# Patient Record
Sex: MALE | Race: OTHER | NOT HISPANIC OR LATINO | URBAN - METROPOLITAN AREA
[De-identification: names, ages, dates, MRNs, and addresses within clinical notes are randomized per-mention and may not be internally consistent; named-entity substitution may affect disease eponyms.]

---

## 2021-11-12 ENCOUNTER — INPATIENT (INPATIENT)
Facility: HOSPITAL | Age: 21
LOS: 5 days | Discharge: HOME | DRG: 178 | End: 2021-11-18
Payer: SELF-PAY

## 2021-11-12 VITALS
DIASTOLIC BLOOD PRESSURE: 66 MMHG | OXYGEN SATURATION: 98 % | SYSTOLIC BLOOD PRESSURE: 100 MMHG | HEART RATE: 118 BPM | RESPIRATION RATE: 18 BRPM | HEIGHT: 69 IN | WEIGHT: 107.14 LBS | TEMPERATURE: 97 F

## 2021-11-12 DIAGNOSIS — D50.9 IRON DEFICIENCY ANEMIA, UNSPECIFIED: ICD-10-CM

## 2021-11-12 DIAGNOSIS — R63.8 OTHER SYMPTOMS AND SIGNS CONCERNING FOOD AND FLUID INTAKE: ICD-10-CM

## 2021-11-12 DIAGNOSIS — R05.9 COUGH, UNSPECIFIED: ICD-10-CM

## 2021-11-12 DIAGNOSIS — J90 PLEURAL EFFUSION, NOT ELSEWHERE CLASSIFIED: ICD-10-CM

## 2021-11-12 DIAGNOSIS — D64.9 ANEMIA, UNSPECIFIED: ICD-10-CM

## 2021-11-12 LAB
ALBUMIN SERPL ELPH-MCNC: 3.4 G/DL — SIGNIFICANT CHANGE UP (ref 3.3–5)
ALP SERPL-CCNC: 94 U/L — SIGNIFICANT CHANGE UP (ref 40–120)
ALT FLD-CCNC: 8 U/L — LOW (ref 10–45)
ANION GAP SERPL CALC-SCNC: 9 MMOL/L — SIGNIFICANT CHANGE UP (ref 5–17)
APTT BLD: 33.9 SEC — SIGNIFICANT CHANGE UP (ref 27.5–35.5)
AST SERPL-CCNC: 36 U/L — SIGNIFICANT CHANGE UP (ref 10–40)
BASOPHILS # BLD AUTO: 0.03 K/UL — SIGNIFICANT CHANGE UP (ref 0–0.2)
BASOPHILS NFR BLD AUTO: 0.6 % — SIGNIFICANT CHANGE UP (ref 0–2)
BILIRUB SERPL-MCNC: <0.2 MG/DL — SIGNIFICANT CHANGE UP (ref 0.2–1.2)
BUN SERPL-MCNC: 10 MG/DL — SIGNIFICANT CHANGE UP (ref 7–23)
CALCIUM SERPL-MCNC: 8.8 MG/DL — SIGNIFICANT CHANGE UP (ref 8.4–10.5)
CHLORIDE SERPL-SCNC: 106 MMOL/L — SIGNIFICANT CHANGE UP (ref 96–108)
CO2 SERPL-SCNC: 25 MMOL/L — SIGNIFICANT CHANGE UP (ref 22–31)
CREAT SERPL-MCNC: 0.8 MG/DL — SIGNIFICANT CHANGE UP (ref 0.5–1.3)
EOSINOPHIL # BLD AUTO: 0.16 K/UL — SIGNIFICANT CHANGE UP (ref 0–0.5)
EOSINOPHIL NFR BLD AUTO: 2.9 % — SIGNIFICANT CHANGE UP (ref 0–6)
GLUCOSE SERPL-MCNC: 98 MG/DL — SIGNIFICANT CHANGE UP (ref 70–99)
HCT VFR BLD CALC: 38.6 % — LOW (ref 39–50)
HGB BLD-MCNC: 11.8 G/DL — LOW (ref 13–17)
IMM GRANULOCYTES NFR BLD AUTO: 0.2 % — SIGNIFICANT CHANGE UP (ref 0–1.5)
INR BLD: 1.33 — HIGH (ref 0.88–1.16)
LYMPHOCYTES # BLD AUTO: 1.32 K/UL — SIGNIFICANT CHANGE UP (ref 1–3.3)
LYMPHOCYTES # BLD AUTO: 24.3 % — SIGNIFICANT CHANGE UP (ref 13–44)
MCHC RBC-ENTMCNC: 23.2 PG — LOW (ref 27–34)
MCHC RBC-ENTMCNC: 30.6 GM/DL — LOW (ref 32–36)
MCV RBC AUTO: 75.8 FL — LOW (ref 80–100)
MONOCYTES # BLD AUTO: 0.6 K/UL — SIGNIFICANT CHANGE UP (ref 0–0.9)
MONOCYTES NFR BLD AUTO: 11 % — SIGNIFICANT CHANGE UP (ref 2–14)
NEUTROPHILS # BLD AUTO: 3.31 K/UL — SIGNIFICANT CHANGE UP (ref 1.8–7.4)
NEUTROPHILS NFR BLD AUTO: 61 % — SIGNIFICANT CHANGE UP (ref 43–77)
NRBC # BLD: 0 /100 WBCS — SIGNIFICANT CHANGE UP (ref 0–0)
PLATELET # BLD AUTO: 453 K/UL — HIGH (ref 150–400)
POTASSIUM SERPL-MCNC: 4.4 MMOL/L — SIGNIFICANT CHANGE UP (ref 3.5–5.3)
POTASSIUM SERPL-SCNC: 4.4 MMOL/L — SIGNIFICANT CHANGE UP (ref 3.5–5.3)
PROCALCITONIN SERPL-MCNC: 0.18 NG/ML — HIGH (ref 0.02–0.1)
PROT SERPL-MCNC: 7.5 G/DL — SIGNIFICANT CHANGE UP (ref 6–8.3)
PROTHROM AB SERPL-ACNC: 15.7 SEC — HIGH (ref 10.6–13.6)
RAPID RVP RESULT: SIGNIFICANT CHANGE UP
RBC # BLD: 5.09 M/UL — SIGNIFICANT CHANGE UP (ref 4.2–5.8)
RBC # FLD: 16.1 % — HIGH (ref 10.3–14.5)
SARS-COV-2 RNA SPEC QL NAA+PROBE: SIGNIFICANT CHANGE UP
SODIUM SERPL-SCNC: 140 MMOL/L — SIGNIFICANT CHANGE UP (ref 135–145)
WBC # BLD: 5.43 K/UL — SIGNIFICANT CHANGE UP (ref 3.8–10.5)
WBC # FLD AUTO: 5.43 K/UL — SIGNIFICANT CHANGE UP (ref 3.8–10.5)

## 2021-11-12 PROCEDURE — 93010 ELECTROCARDIOGRAM REPORT: CPT

## 2021-11-12 PROCEDURE — 99222 1ST HOSP IP/OBS MODERATE 55: CPT | Mod: GC

## 2021-11-12 PROCEDURE — 99285 EMERGENCY DEPT VISIT HI MDM: CPT | Mod: 25

## 2021-11-12 PROCEDURE — 71250 CT THORAX DX C-: CPT | Mod: 26,MA

## 2021-11-12 RX ORDER — SODIUM CHLORIDE 9 MG/ML
5 INJECTION INTRAMUSCULAR; INTRAVENOUS; SUBCUTANEOUS EVERY 8 HOURS
Refills: 0 | Status: COMPLETED | OUTPATIENT
Start: 2021-11-12 | End: 2021-11-13

## 2021-11-12 RX ORDER — ACETAMINOPHEN 500 MG
650 TABLET ORAL EVERY 6 HOURS
Refills: 0 | Status: DISCONTINUED | OUTPATIENT
Start: 2021-11-12 | End: 2021-11-18

## 2021-11-12 RX ORDER — SODIUM CHLORIDE 9 MG/ML
1500 INJECTION INTRAMUSCULAR; INTRAVENOUS; SUBCUTANEOUS ONCE
Refills: 0 | Status: COMPLETED | OUTPATIENT
Start: 2021-11-12 | End: 2021-11-12

## 2021-11-12 RX ORDER — ACETAMINOPHEN 500 MG
650 TABLET ORAL ONCE
Refills: 0 | Status: COMPLETED | OUTPATIENT
Start: 2021-11-12 | End: 2021-11-12

## 2021-11-12 RX ORDER — INFLUENZA VIRUS VACCINE 15; 15; 15; 15 UG/.5ML; UG/.5ML; UG/.5ML; UG/.5ML
0.5 SUSPENSION INTRAMUSCULAR ONCE
Refills: 0 | Status: DISCONTINUED | OUTPATIENT
Start: 2021-11-12 | End: 2021-11-18

## 2021-11-12 RX ADMIN — SODIUM CHLORIDE 1500 MILLILITER(S): 9 INJECTION INTRAMUSCULAR; INTRAVENOUS; SUBCUTANEOUS at 18:10

## 2021-11-12 RX ADMIN — Medication 650 MILLIGRAM(S): at 18:10

## 2021-11-12 NOTE — ED PROVIDER NOTE - CLINICAL SUMMARY MEDICAL DECISION MAKING FREE TEXT BOX
Patient in ED w concern for cough, fever over the past several days.  Patient well appearing, lungs clear with decreased breath sounds to RLL.  Plueral effusion noted on outpatient CXR - sent to ED for further eval.  CT chest, labs ordered on arrival. Patient in ED w concern for cough, fever over the past several days.  Patient well appearing, lungs clear with decreased breath sounds to RLL.  Plueral effusion noted on outpatient CXR - sent to ED for further eval.  CT chest, labs ordered on arrival.  Following shift completion, patient's care is handed over to oncoming physician, Dr. Weiss, for final disposition following review of CT, re eval of patient, etc.  Patient and family aware of plan and patient is stable at time of transfer of care.

## 2021-11-12 NOTE — H&P ADULT - HISTORY OF PRESENT ILLNESS
Pt is a 20 y/o M w/ no pmhx presenting with complaints of cough x5 days.    In ED, vitals T 97.4, , /76, RR 18, O2 98% on RA  Labs significant for Hb 11.8, MCV 75.8, unknown baseline  EKG: sinus tachycardia, no acute ischemic changes  Imaging: CT chest no contrast: Impression:  1. Moderate-sized right pleural effusion which is mildly loculated anteriorly, superiorly and medially encasing the right upper lobe.  2. Linear parenchymal bands of scarring and/or atelectasis within the right upper and right middle lobes with adjacent traction bronchiectasis as well as scattered tree-in-bud micronodules. Abbreviated differential includes chronic infectious and/or inflammatory etiologies such as RALPH and in the proper clinical setting primary TB cannot be excluded.     Pt is a 20 y/o M w/ no pmhx presenting with complaints of dry cough x5 days.    In ED, vitals T 97.4, , /76, RR 18, O2 98% on RA  Labs significant for Hb 11.8, MCV 75.8, unknown baseline  EKG: sinus tachycardia, no acute ischemic changes  Imaging: CT chest no contrast: Impression:  1. Moderate-sized right pleural effusion which is mildly loculated anteriorly, superiorly and medially encasing the right upper lobe.  2. Linear parenchymal bands of scarring and/or atelectasis within the right upper and right middle lobes with adjacent traction bronchiectasis as well as scattered tree-in-bud micronodules. Abbreviated differential includes chronic infectious and/or inflammatory etiologies such as RALPH and in the proper clinical setting primary TB cannot be excluded.     Pt is a 22 y/o M w/ no pmhx presenting with complaints of cough x5 days. Reports some clear/yellow sputum, denies any blood, and reports associated chest pain with cough. He also complains of GARCIA. Pt also reports fevers only at night time for the past 2 weeks with associated night sweats. He states fever were as high as 102. Denies any weight loss, recent travel. He states he was born in [place] and moved to the [place] 3 years ago. Denies any hx of TB exposures or sick contacts. No abd pain, n/v/d, blood in stool, black stools, headache, or dizziness.     In ED, vitals T 97.4, , /76, RR 18, O2 98% on RA  Labs significant for Hb 11.8, MCV 75.8, unknown baseline  EKG: sinus tachycardia, no acute ischemic changes  Imaging: CT chest no contrast: Impression:  1. Moderate-sized right pleural effusion which is mildly loculated anteriorly, superiorly and medially encasing the right upper lobe.  2. Linear parenchymal bands of scarring and/or atelectasis within the right upper and right middle lobes with adjacent traction bronchiectasis as well as scattered tree-in-bud micronodules. Abbreviated differential includes chronic infectious and/or inflammatory etiologies such as RALPH and in the proper clinical setting primary TB cannot be excluded.     Pt is a 22 y/o M w/ no pmhx presenting with complaints of cough x5 days. Reports some clear/yellow sputum, denies any blood, and reports associated chest pain with cough. He also complains of GARCIA. Pt also reports fevers only at night time for the past 2 weeks with associated night sweats. He states fever were as high as 102. Denies any weight loss, recent travel. He states he was born in [place] and moved to the [place] years ago. Denies any hx of TB exposures or sick contacts. No abd pain, n/v/d, blood in stool, black stools, headache, or dizziness.     In ED, vitals T 97.4, , /76, RR 18, O2 98% on RA  Labs significant for Hb 11.8, MCV 75.8, unknown baseline  EKG: sinus tachycardia, no acute ischemic changes  Imaging: CT chest no contrast: Impression:  1. Moderate-sized right pleural effusion which is mildly loculated anteriorly, superiorly and medially encasing the right upper lobe.  2. Linear parenchymal bands of scarring and/or atelectasis within the right upper and right middle lobes with adjacent traction bronchiectasis as well as scattered tree-in-bud micronodules. Abbreviated differential includes chronic infectious and/or inflammatory etiologies such as RALPH and in the proper clinical setting primary TB cannot be excluded.  ED management: s/p 1.5 L NS, tylenol, pulm consulted.   Pt is a 22 y/o M w/ no pmhx presenting with complaints of cough x5 days. Denies any blood in sputum. Reports associated chest pain only when he coughs. He states he also has some SOB on exertion, feels comfortable at rest. Pt also reports fevers only at night time for the past 2 weeks with associated night sweats. He states fever were as high as 102. Denies any weight loss, recent travel. He states he was born in [place] and moved [place] the [place] years ago. Denies any hx of TB/exposures. No abd pain, n/v/d, blood in stool, black stools, headache, or dizziness.     In ED, vitals T 97.4, , /76, RR 18, O2 98% on RA  Labs significant for Hb 11.8, MCV 75.8, unknown baseline  EKG: sinus tachycardia, no ischemic changes  Imaging: CT chest no contrast: Impression:  1. Moderate-sized right pleural effusion which is mildly loculated anteriorly, superiorly and medially encasing the right upper lobe.  2. Linear parenchymal bands of scarring and/or atelectasis within the right upper and right middle lobes with adjacent traction bronchiectasis as well as scattered tree-in-bud micronodules. Abbreviated differential includes chronic infectious and/or inflammatory etiologies such as RALPH and in the proper clinical setting primary TB cannot be excluded.  ED management: s/p 1.5 L NS, tylenol, pulm consulted.

## 2021-11-12 NOTE — H&P ADULT - ATTENDING COMMENTS
21 year old male patient [place] presented with worsening productive cough for 5 days duration associated with shortness of breath on exertion. patient is observed to be tachycardic in the ED to 118 and his labs are significant for anemia of Hgb 11.8, MCV 75.8. And his CT chest shows moderate-sized right pleural effusion which is mildly loculated anteriorly, superiorly and medially encasing the right upper lobe and linear parenchymal bands of scarring and/or atelectasis within the right upper and right middle lobes with adjacent traction bronchiectasis as well as scattered tree-in-bud micronodules. Patient is admitted for workup of cough and abnormal lung ct findings and concern of active tb.    1. Dyspnea on exertion  likely secondary to an infectious etiology  procalcitonin for eval of typical bacterial infection  RVP is negative for viral infection  fungitell for eval of fungal and PCP infections    2. Pleural Effusion  pulmonary consult     3.Abnormal Chest CT  airborne precautions for r/o tb  r/o tb with sputum collection AFB    4.Anemia  iron studies  reticulocyte count  b12 and folate

## 2021-11-12 NOTE — ED PROVIDER NOTE - OBJECTIVE STATEMENT
21 year old male with no past medical history presents to ED with concern for cough, fever and night sweats x 5 days.  Patient was seen by his PCP and diagnosed with right sided pleural effusion and recommendation was made that he come to ED for further evaluation.  Patient is non toxic in appearance on arrival to ED, notes he is eating and drinking and only notices temperature elevation in the evening.  He denies recent travel, history of TB, noted to be fully vaccinated per parents at bedside.  Patient denies associated neck pain/stiffness, headache, visual changes, chest pain, shortness of breath, abdominal pain, nausea, emesis, changes to bowel movements, peripheral edema, calf pain/tenderness, recent travel, known sick contacts or any additional acute complaints or concerns at this time.

## 2021-11-12 NOTE — ED PROVIDER NOTE - NS ED ROS FT
Constitutional: + Fever.  Eyes: No pain, blurry vision, or discharge.  ENMT: No hearing changes, pain, discharge or infections. No neck pain or stiffness.  Cardiac: No chest pain, SOB or edema. No chest pain with exertion.  Respiratory: + cough, no respiratory distress. No hemoptysis. No history of asthma or RAD.  GI: No nausea, vomiting, diarrhea or abdominal pain.  : No dysuria, frequency or burning.  MS: No myalgia, muscle weakness, joint pain or back pain.  Neuro: No headache or weakness. No LOC.  Skin: No skin rash.   Endocrine: No history of thyroid disease or diabetes.  Except as documented in the HPI, all other systems are negative.

## 2021-11-12 NOTE — H&P ADULT - ASSESSMENT
Pt is a 22 y/o M w/ no pmhx presenting with complaints of cough x5 days, found to have right sided pleural effusion. Pt admitted for further eval/management of pleural effusion and r/o TB.

## 2021-11-12 NOTE — H&P ADULT - NSHPPHYSICALEXAM_GEN_ALL_CORE
VITAL SIGNS:  T(C): 36.3 (11-12-21 @ 15:04), Max: 36.3 (11-12-21 @ 15:04)  T(F): 97.4 (11-12-21 @ 15:04), Max: 97.4 (11-12-21 @ 15:04)  HR: 118 (11-12-21 @ 15:04) (118 - 118)  BP: 100/66 (11-12-21 @ 15:04) (100/66 - 100/66)  BP(mean): --  RR: 18 (11-12-21 @ 15:04) (18 - 18)  SpO2: 98% (11-12-21 @ 15:04) (98% - 98%)  Wt(kg): --    PHYSICAL EXAM:    Constitutional: WDWN resting comfortably in bed; NAD  Head: NC/AT  Eyes: PERRL, EOMI, anicteric sclera  ENT: no nasal discharge; uvula midline, no oropharyngeal erythema or exudates; MMM  Neck: supple; no JVD or thyromegaly  Respiratory: CTA B/L; no W/R/R, no retractions  Cardiac: +S1/S2; RRR; no M/R/G  Gastrointestinal: abdomen soft, NT/ND; no rebound or guarding; +BSx4  Back: spine midline, no bony tenderness or step-offs  Extremities: WWP, no clubbing or cyanosis; no peripheral edema  Musculoskeletal: NROM x4; no joint swelling, tenderness or erythema  Vascular: 2+ radial, DP/PT pulses B/L  Dermatologic: skin warm, dry and intact; no rashes, wounds, or scars  Lymphatic: no submandibular or cervical LAD  Neurologic: AAOx3; CNII-XII grossly intact; no focal deficits  Psychiatric: affect and characteristics of appearance, verbalizations, behaviors are appropriate VITAL SIGNS:  T(C): 36.3 (11-12-21 @ 15:04), Max: 36.3 (11-12-21 @ 15:04)  T(F): 97.4 (11-12-21 @ 15:04), Max: 97.4 (11-12-21 @ 15:04)  HR: 118 (11-12-21 @ 15:04) (118 - 118)  BP: 100/66 (11-12-21 @ 15:04) (100/66 - 100/66)  BP(mean): --  RR: 18 (11-12-21 @ 15:04) (18 - 18)  SpO2: 98% (11-12-21 @ 15:04) (98% - 98%)  Wt(kg): --    PHYSICAL EXAM:    Constitutional: thin male, resting comfortably in bed; NAD  Head: NC/AT  Eyes: PERRL, EOMI, anicteric sclera  ENT: no nasal discharge; uvula midline, no oropharyngeal erythema or exudates; MMM  Neck: supple; no JVD or thyromegaly  Respiratory: decreased breath sounds on the right; no W/R/R, no retractions  Cardiac: +S1/S2; RRR; no M/R/G  Gastrointestinal: abdomen soft, NT/ND; no rebound or guarding; +BSx4  Back: spine midline, no bony tenderness or step-offs  Extremities: WWP, no clubbing or cyanosis; no peripheral edema  Musculoskeletal: NROM x4; no joint swelling, tenderness or erythema  Vascular: 2+ radial, DP/PT pulses B/L  Dermatologic: skin warm, dry and intact; no rashes, wounds, or scars  Lymphatic: no submandibular or cervical LAD  Neurologic: AAOx3; CNII-XII grossly intact; no focal deficits  Psychiatric: affect and characteristics of appearance, verbalizations, behaviors are appropriate VITAL SIGNS:  T(C): 36.3 (11-12-21 @ 15:04), Max: 36.3 (11-12-21 @ 15:04)  T(F): 97.4 (11-12-21 @ 15:04), Max: 97.4 (11-12-21 @ 15:04)  HR: 118 (11-12-21 @ 15:04) (118 - 118)  BP: 100/66 (11-12-21 @ 15:04) (100/66 - 100/66)  BP(mean): --  RR: 18 (11-12-21 @ 15:04) (18 - 18)  SpO2: 98% (11-12-21 @ 15:04) (98% - 98%)  Wt(kg): --    PHYSICAL EXAM:    Constitutional: thin male, resting comfortably in bed; NAD  Head: NC/AT  Eyes: PERRL, EOMI, anicteric sclera  ENT: no nasal discharge; uvula midline, no oropharyngeal erythema or exudates; MMM  Neck: supple; no JVD or thyromegaly  Respiratory: decreased breath sounds on the right; no W/R/R, no retractions. speaking in full sentences, no respiratory distress.  Cardiac: +S1/S2; RRR; no M/R/G  Gastrointestinal: abdomen soft, NT/ND; no rebound or guarding; +BSx4  Back: spine midline, no bony tenderness or step-offs  Extremities: WWP, no clubbing or cyanosis; no peripheral edema  Musculoskeletal: NROM x4; no joint swelling, tenderness or erythema  Vascular: 2+ radial, DP/PT pulses B/L  Dermatologic: skin warm, dry and intact; no rashes, wounds, or scars  Lymphatic: no submandibular or cervical LAD  Neurologic: AAOx3; CNII-XII grossly intact; no focal deficits  Psychiatric: affect and characteristics of appearance, verbalizations, behaviors are appropriate

## 2021-11-12 NOTE — ED PROVIDER NOTE - PHYSICAL EXAMINATION
VITAL SIGNS: I have reviewed nursing notes and confirm.  CONSTITUTIONAL: Non toxic; in no acute distress.   SKIN:  Warm and dry, no acute rash.   HEAD:  Normocephalic, atraumatic.  EYES: PERRL.  EOM intact; conjunctiva and sclera clear.  ENT: No nasal discharge; airway clear.   NECK: Supple; non tender.  CARD: S1, S2 normal; no murmurs, gallops, or rubs. Regular rate and rhythm.   RESP:  + Decreased breath sounds to RLL.  Clear to auscultation b/l, no wheezes, rales or rhonchi.  No increased work of breathing, speaking comfortably in full sentences.    ABD: Normal bowel sounds; soft; non-distended; non-tender; no guarding/rebound.  EXT: Normal ROM. No clubbing, cyanosis or edema. 2+ pulses to b/l ue/le.  NEURO: Alert, oriented, grossly unremarkable.  5/5 strength x 4 extremities against gravity and external force.  No drift x 4 extremities.  Sensation intact and symmetric x 4 extremities.  No facial asymmetry.    PSYCH: Cooperative, mood and affect appropriate.

## 2021-11-12 NOTE — ED PROVIDER NOTE - CARE PLAN
Principal Discharge DX:	Cough  Secondary Diagnosis:	Fever   1 Principal Discharge DX:	Cough  Secondary Diagnosis:	Fever  Secondary Diagnosis:	Pleural effusion

## 2021-11-12 NOTE — H&P ADULT - PROBLEM SELECTOR PLAN 2
- moderate sized right pleural effusion  - ddx included likely infectious - fungal/edgardo vs bacterial vs tb, less likely malignancy or autoimmune  - f/u pulm recs for possible thoracentesis - moderate sized right pleural effusion  - ddx included likely infectious - bacterial vs tb/edgardo vs fungal, less likely malignancy or autoimmune  - f/u pulm recs for possible thoracentesis

## 2021-11-12 NOTE — ED ADULT NURSE NOTE - OBJECTIVE STATEMENT
20 y/o M Presents to the ED from PCP to r/o TB. Pt was recently diagnosed with plural effusion with worsening symptoms of SOB, night sweats, chills, fever. DENIES weight loss, n/v, chest pain, hemoptysis.

## 2021-11-12 NOTE — H&P ADULT - PROBLEM SELECTOR PLAN 1
Pt presenting with cough x5 days with also associated fevers as high as 102 x2 weeks. No hemoptysis, weight loss or recent travel or hx of TB. [place]oved [place] from [place] 3 years ago.  - CT chest showing moderate-sized right pleural effusion which is mildly loculated anteriorly, superiorly and medially encasing the right upper lobe. Linear parenchymal bands of scarring and/or atelectasis within the right upper and right middle lobes with adjacent traction bronchiectasis as well as scattered tree-in-bud micronodules.   - will r/o for TB  - consider abx  - f/u pulm recs Pt presenting with cough x5 days with also associated fevers as high as 102 x2 weeks. No hemoptysis, weight loss or recent travel or hx of TB. [place]oved to [place] from [place] 3 years ago.  - CT chest showing moderate-sized right pleural effusion which is mildly loculated anteriorly, superiorly and medially encasing the right upper lobe. Linear parenchymal bands of scarring and/or atelectasis within the right upper and right middle lobes with adjacent traction bronchiectasis as well as scattered tree-in-bud micronodules.   - CT concerning for infectious etiology including RALPH vs TB. Will r/o for TB  - consider abx for bacterial coverage  - f/u pulm recs Pt presenting with cough x5 days with also associated fevers as high as 102 x2 weeks. No hemoptysis, weight loss or recent travel or hx of TB. [place]oved to [place] from [place] 3 years ago.  - CT chest showing moderate-sized right pleural effusion which is mildly loculated anteriorly, superiorly and medially encasing the right upper lobe. Linear parenchymal bands of scarring and/or atelectasis within the right upper and right middle lobes with adjacent traction bronchiectasis as well as scattered tree-in-bud micronodules.   - CT concerning for infectious etiology including RALPH vs TB. Will r/o for TB  - will hold of on abx for now   - f/u procalcitonin, fungitell, aspergillus, sputum cx, AFB sputum culture  - f/u pulm recs

## 2021-11-12 NOTE — ED PROVIDER NOTE - PROGRESS NOTE DETAILS
Director - pt received from Dr Mcgowan.  VS, labs reviewed.  Pt in ct scan.  EKG w sinus tach.  Tech informed me pt w low grade temp and bp 95.  IVF and tylenol ordered.  CT pending. Director - ct w effusion, changes suggestive of tb vs edgardo.  Pulm consulted. Director - ct w effusion, changes suggestive of tb vs edgardo.  Pulm consulted and will eval - recommend admit for r/o tb.

## 2021-11-12 NOTE — H&P ADULT - NSHPLABSRESULTS_GEN_ALL_CORE
.  LABS:                         11.8   5.43  )-----------( 453      ( 12 Nov 2021 16:02 )             38.6     11-12    140  |  106  |  10  ----------------------------<  98  4.4   |  25  |  0.80    Ca    8.8      12 Nov 2021 16:02    TPro  7.5  /  Alb  3.4  /  TBili  <0.2  /  DBili  x   /  AST  36  /  ALT  8<L>  /  AlkPhos  94  11-12    PT/INR - ( 12 Nov 2021 16:02 )   PT: 15.7 sec;   INR: 1.33          PTT - ( 12 Nov 2021 16:02 )  PTT:33.9 sec              RADIOLOGY, EKG & ADDITIONAL TESTS: Reviewed.

## 2021-11-13 LAB
ALBUMIN FLD-MCNC: 2.8 G/DL — SIGNIFICANT CHANGE UP
ANION GAP SERPL CALC-SCNC: 9 MMOL/L — SIGNIFICANT CHANGE UP (ref 5–17)
BASOPHILS # BLD AUTO: 0.05 K/UL — SIGNIFICANT CHANGE UP (ref 0–0.2)
BASOPHILS NFR BLD AUTO: 0.9 % — SIGNIFICANT CHANGE UP (ref 0–2)
BUN SERPL-MCNC: 7 MG/DL — SIGNIFICANT CHANGE UP (ref 7–23)
CALCIUM SERPL-MCNC: 8.9 MG/DL — SIGNIFICANT CHANGE UP (ref 8.4–10.5)
CHLORIDE SERPL-SCNC: 107 MMOL/L — SIGNIFICANT CHANGE UP (ref 96–108)
CO2 SERPL-SCNC: 20 MMOL/L — LOW (ref 22–31)
COVID-19 NUCLEOCAPSID GAM AB INTERP: NEGATIVE — SIGNIFICANT CHANGE UP
COVID-19 NUCLEOCAPSID TOTAL GAM ANTIBODY RESULT: 0.07 INDEX — SIGNIFICANT CHANGE UP
COVID-19 SPIKE DOMAIN AB INTERP: POSITIVE
COVID-19 SPIKE DOMAIN ANTIBODY RESULT: >250 U/ML — HIGH
CREAT FLD-MCNC: <0.06 MG/DL — SIGNIFICANT CHANGE UP
CREAT SERPL-MCNC: 0.68 MG/DL — SIGNIFICANT CHANGE UP (ref 0.5–1.3)
CULTURE RESULTS: SIGNIFICANT CHANGE UP
EOSINOPHIL # BLD AUTO: 0.21 K/UL — SIGNIFICANT CHANGE UP (ref 0–0.5)
EOSINOPHIL NFR BLD AUTO: 3.7 % — SIGNIFICANT CHANGE UP (ref 0–6)
FERRITIN SERPL-MCNC: 41 NG/ML — SIGNIFICANT CHANGE UP (ref 30–400)
FOLATE SERPL-MCNC: 9.1 NG/ML — SIGNIFICANT CHANGE UP
GLUCOSE SERPL-MCNC: 92 MG/DL — SIGNIFICANT CHANGE UP (ref 70–99)
GRAM STN FLD: SIGNIFICANT CHANGE UP
HCT VFR BLD CALC: 33.7 % — LOW (ref 39–50)
HCT VFR BLD CALC: 39.3 % — SIGNIFICANT CHANGE UP (ref 39–50)
HGB BLD-MCNC: 12.1 G/DL — LOW (ref 13–17)
HGB BLD-MCNC: 9.9 G/DL — LOW (ref 13–17)
IMM GRANULOCYTES NFR BLD AUTO: 0.4 % — SIGNIFICANT CHANGE UP (ref 0–1.5)
IRON SATN MFR SERPL: 12 % — LOW (ref 16–55)
IRON SATN MFR SERPL: 25 UG/DL — LOW (ref 45–165)
LDH SERPL L TO P-CCNC: 351 U/L — SIGNIFICANT CHANGE UP
LEGIONELLA AG UR QL: NEGATIVE — SIGNIFICANT CHANGE UP
LYMPHOCYTES # BLD AUTO: 1.62 K/UL — SIGNIFICANT CHANGE UP (ref 1–3.3)
LYMPHOCYTES # BLD AUTO: 28.6 % — SIGNIFICANT CHANGE UP (ref 13–44)
MCHC RBC-ENTMCNC: 22.4 PG — LOW (ref 27–34)
MCHC RBC-ENTMCNC: 23.3 PG — LOW (ref 27–34)
MCHC RBC-ENTMCNC: 29.4 GM/DL — LOW (ref 32–36)
MCHC RBC-ENTMCNC: 30.8 GM/DL — LOW (ref 32–36)
MCV RBC AUTO: 75.7 FL — LOW (ref 80–100)
MCV RBC AUTO: 76.4 FL — LOW (ref 80–100)
MONOCYTES # BLD AUTO: 0.61 K/UL — SIGNIFICANT CHANGE UP (ref 0–0.9)
MONOCYTES NFR BLD AUTO: 10.8 % — SIGNIFICANT CHANGE UP (ref 2–14)
NEUTROPHILS # BLD AUTO: 3.16 K/UL — SIGNIFICANT CHANGE UP (ref 1.8–7.4)
NEUTROPHILS NFR BLD AUTO: 55.6 % — SIGNIFICANT CHANGE UP (ref 43–77)
NRBC # BLD: 0 /100 WBCS — SIGNIFICANT CHANGE UP (ref 0–0)
NRBC # BLD: 0 /100 WBCS — SIGNIFICANT CHANGE UP (ref 0–0)
PH, PLEURAL FLUID: 7.63 — SIGNIFICANT CHANGE UP
PLATELET # BLD AUTO: 402 K/UL — HIGH (ref 150–400)
PLATELET # BLD AUTO: 469 K/UL — HIGH (ref 150–400)
POTASSIUM SERPL-MCNC: 4.1 MMOL/L — SIGNIFICANT CHANGE UP (ref 3.5–5.3)
POTASSIUM SERPL-SCNC: 4.1 MMOL/L — SIGNIFICANT CHANGE UP (ref 3.5–5.3)
PROT FLD-MCNC: 5 G/DL — SIGNIFICANT CHANGE UP
RBC # BLD: 4.41 M/UL — SIGNIFICANT CHANGE UP (ref 4.2–5.8)
RBC # BLD: 4.41 M/UL — SIGNIFICANT CHANGE UP (ref 4.2–5.8)
RBC # BLD: 5.19 M/UL — SIGNIFICANT CHANGE UP (ref 4.2–5.8)
RBC # FLD: 16.1 % — HIGH (ref 10.3–14.5)
RBC # FLD: 16.3 % — HIGH (ref 10.3–14.5)
RETICS #: 35.7 K/UL — SIGNIFICANT CHANGE UP (ref 25–125)
RETICS/RBC NFR: 0.8 % — SIGNIFICANT CHANGE UP (ref 0.5–2.5)
SARS-COV-2 IGG+IGM SERPL QL IA: 0.07 INDEX — SIGNIFICANT CHANGE UP
SARS-COV-2 IGG+IGM SERPL QL IA: >250 U/ML — HIGH
SARS-COV-2 IGG+IGM SERPL QL IA: NEGATIVE — SIGNIFICANT CHANGE UP
SARS-COV-2 IGG+IGM SERPL QL IA: POSITIVE
SODIUM SERPL-SCNC: 136 MMOL/L — SIGNIFICANT CHANGE UP (ref 135–145)
SPECIMEN SOURCE FLD: SIGNIFICANT CHANGE UP
SPECIMEN SOURCE: SIGNIFICANT CHANGE UP
TIBC SERPL-MCNC: 214 UG/DL — LOW (ref 220–430)
UIBC SERPL-MCNC: 189 UG/DL — SIGNIFICANT CHANGE UP (ref 110–370)
VIT B12 SERPL-MCNC: 330 PG/ML — SIGNIFICANT CHANGE UP (ref 232–1245)
WBC # BLD: 5.04 K/UL — SIGNIFICANT CHANGE UP (ref 3.8–10.5)
WBC # BLD: 5.67 K/UL — SIGNIFICANT CHANGE UP (ref 3.8–10.5)
WBC # FLD AUTO: 5.04 K/UL — SIGNIFICANT CHANGE UP (ref 3.8–10.5)
WBC # FLD AUTO: 5.67 K/UL — SIGNIFICANT CHANGE UP (ref 3.8–10.5)

## 2021-11-13 PROCEDURE — 99223 1ST HOSP IP/OBS HIGH 75: CPT | Mod: 25

## 2021-11-13 PROCEDURE — 99233 SBSQ HOSP IP/OBS HIGH 50: CPT | Mod: GC

## 2021-11-13 PROCEDURE — 71045 X-RAY EXAM CHEST 1 VIEW: CPT | Mod: 26

## 2021-11-13 PROCEDURE — 32551 INSERTION OF CHEST TUBE: CPT | Mod: GC

## 2021-11-13 RX ORDER — OXYCODONE AND ACETAMINOPHEN 5; 325 MG/1; MG/1
2 TABLET ORAL EVERY 6 HOURS
Refills: 0 | Status: DISCONTINUED | OUTPATIENT
Start: 2021-11-13 | End: 2021-11-14

## 2021-11-13 RX ORDER — ONDANSETRON 8 MG/1
4 TABLET, FILM COATED ORAL EVERY 6 HOURS
Refills: 0 | Status: DISCONTINUED | OUTPATIENT
Start: 2021-11-13 | End: 2021-11-18

## 2021-11-13 RX ORDER — FERROUS SULFATE 325(65) MG
325 TABLET ORAL DAILY
Refills: 0 | Status: DISCONTINUED | OUTPATIENT
Start: 2021-11-13 | End: 2021-11-18

## 2021-11-13 RX ADMIN — OXYCODONE AND ACETAMINOPHEN 2 TABLET(S): 5; 325 TABLET ORAL at 20:16

## 2021-11-13 RX ADMIN — OXYCODONE AND ACETAMINOPHEN 2 TABLET(S): 5; 325 TABLET ORAL at 18:53

## 2021-11-13 RX ADMIN — SODIUM CHLORIDE 5 MILLILITER(S): 9 INJECTION INTRAMUSCULAR; INTRAVENOUS; SUBCUTANEOUS at 05:36

## 2021-11-13 RX ADMIN — Medication 325 MILLIGRAM(S): at 15:02

## 2021-11-13 RX ADMIN — SODIUM CHLORIDE 5 MILLILITER(S): 9 INJECTION INTRAMUSCULAR; INTRAVENOUS; SUBCUTANEOUS at 14:20

## 2021-11-13 RX ADMIN — SODIUM CHLORIDE 5 MILLILITER(S): 9 INJECTION INTRAMUSCULAR; INTRAVENOUS; SUBCUTANEOUS at 22:55

## 2021-11-13 NOTE — PROGRESS NOTE ADULT - PROBLEM SELECTOR PLAN 1
Pt presenting with cough x5 days with also associated fevers as high as 102 x2 weeks. No hemoptysis, weight loss or recent travel or hx of TB. [place]oved [place] 3 years ago.  - CT chest showing moderate-sized right pleural effusion which is mildly loculated anteriorly, superiorly and medially encasing the right upper lobe. Linear parenchymal bands of scarring and/or atelectasis within the right upper and right middle lobes with adjacent traction bronchiectasis as well as scattered tree-in-bud micronodules.   - CT concerning for infectious etiology including RALPH vs TB. Will r/o for TB  - will hold of on abx for now   - f/u procalcitonin, fungitell, aspergillus, sputum cx, AFB sputum culture  - f/u pulm recs Pt presenting with cough x5 days with also associated fevers as high as 102 x2 weeks. No hemoptysis, weight loss or recent travel or hx of TB. Moved to [place] from [place] 3 years ago.  - CT chest showing moderate-sized right pleural effusion which is mildly loculated anteriorly, superiorly and medially encasing the right upper lobe. Linear parenchymal bands of scarring and/or atelectasis within the right upper and right middle lobes with adjacent traction bronchiectasis as well as scattered tree-in-bud micronodules.       - CT concerning for infectious etiology including RALPH vs TB. Will r/o for TB  - loculated effusion seen on US   - will hold of on abx for now   - f/u procalcitonin, fungitell, aspergillus, sputum cx, AFB sputum culture

## 2021-11-13 NOTE — PROGRESS NOTE ADULT - PROBLEM SELECTOR PLAN 2
- moderate sized right pleural effusion  - ddx included likely infectious - bacterial vs tb/edgardo vs fungal, less likely malignancy or autoimmune  - f/u pulm recs for possible thoracentesis Moderate sized right pleural effusion, loculation seen on US. Consider bacterial vs tb/edgardo vs fungal, less likely malignancy or autoimmune    - s/p chest tube placement w/ pulm   - continue to monitor

## 2021-11-13 NOTE — DIETITIAN INITIAL EVALUATION ADULT. - OTHER CALCULATIONS
ABW used for calculations as pt between <120% of IBW. (66%). Needs adjusted for malnutrition/ healthy weight gain.

## 2021-11-13 NOTE — PROGRESS NOTE ADULT - SUBJECTIVE AND OBJECTIVE BOX
OVERNIGHT EVENTS:    SUBJECTIVE / INTERVAL HPI: Patient seen and examined at bedside.     VITAL SIGNS:  Vital Signs Last 24 Hrs  T(C): 36.9 (13 Nov 2021 06:03), Max: 36.9 (13 Nov 2021 06:03)  T(F): 98.4 (13 Nov 2021 06:03), Max: 98.4 (13 Nov 2021 06:03)  HR: 83 (13 Nov 2021 06:03) (76 - 118)  BP: 98/67 (13 Nov 2021 06:03) (95/61 - 100/66)  BP(mean): --  RR: 20 (13 Nov 2021 06:03) (18 - 20)  SpO2: 98% (13 Nov 2021 06:03) (98% - 100%)  I&O's Summary      PHYSICAL EXAM:    General: NAD, lying comfortably in bed   HEENT: Anicteric sclera, EOMI, MMM  Cardiovascular: +S1/S2; RRR; No JVD   Respiratory: CTAB, no accessory muscle use  Gastrointestinal: soft, non-distended, no tenderness to palpation  Extremities: WWP; no edema, no erythema, no tenderness to palpation  Vascular: 2+ radial, DP/PT pulses B/L  Neurological: AAOx3; no focal deficits  Skin: No visible rash, skin discoloration    MEDICATIONS:  MEDICATIONS  (STANDING):  influenza   Vaccine 0.5 milliLiter(s) IntraMuscular once  sodium chloride 3%  Inhalation 5 milliLiter(s) Inhalation every 8 hours    MEDICATIONS  (PRN):  acetaminophen     Tablet .. 650 milliGRAM(s) Oral every 6 hours PRN Temp greater or equal to 38C (100.4F), Mild Pain (1 - 3)      ALLERGIES:  Allergies    No Known Allergies    Intolerances        LABS:                        9.9    5.67  )-----------( 402      ( 13 Nov 2021 07:33 )             33.7     11-13    136  |  107  |  7   ----------------------------<  92  4.1   |  20<L>  |  0.68    Ca    8.9      13 Nov 2021 07:33    TPro  7.5  /  Alb  3.4  /  TBili  <0.2  /  DBili  x   /  AST  36  /  ALT  8<L>  /  AlkPhos  94  11-12    PT/INR - ( 12 Nov 2021 16:02 )   PT: 15.7 sec;   INR: 1.33          PTT - ( 12 Nov 2021 16:02 )  PTT:33.9 sec    CAPILLARY BLOOD GLUCOSE          RADIOLOGY & ADDITIONAL TESTS: Reviewed.   OVERNIGHT EVENTS:    SUBJECTIVE / INTERVAL HPI: Patient seen and examined at bedside.     GARCIA noted by patient.    12 point ros negative except for above    VITAL SIGNS:  Vital Signs Last 24 Hrs  T(C): 36.9 (13 Nov 2021 06:03), Max: 36.9 (13 Nov 2021 06:03)  T(F): 98.4 (13 Nov 2021 06:03), Max: 98.4 (13 Nov 2021 06:03)  HR: 83 (13 Nov 2021 06:03) (76 - 118)  BP: 98/67 (13 Nov 2021 06:03) (95/61 - 100/66)  BP(mean): --  RR: 20 (13 Nov 2021 06:03) (18 - 20)  SpO2: 98% (13 Nov 2021 06:03) (98% - 100%)  I&O's Summary      PHYSICAL EXAM:    General: NAD, lying comfortably in bed   HEENT: Anicteric sclera, EOMI, MMM  Cardiovascular: +S1/S2; RRR; No JVD   Respiratory: CTAB, no accessory muscle use  Gastrointestinal: soft, non-distended, no tenderness to palpation  Extremities: WWP; no edema, no erythema, no tenderness to palpation  Vascular: 2+ radial, DP/PT pulses B/L  Neurological: AAOx3; no focal deficits  Skin: No visible rash, skin discoloration    MEDICATIONS:  MEDICATIONS  (STANDING):  influenza   Vaccine 0.5 milliLiter(s) IntraMuscular once  sodium chloride 3%  Inhalation 5 milliLiter(s) Inhalation every 8 hours    MEDICATIONS  (PRN):  acetaminophen     Tablet .. 650 milliGRAM(s) Oral every 6 hours PRN Temp greater or equal to 38C (100.4F), Mild Pain (1 - 3)      ALLERGIES:  Allergies    No Known Allergies    Intolerances        LABS:                        9.9    5.67  )-----------( 402      ( 13 Nov 2021 07:33 )             33.7     11-13    136  |  107  |  7   ----------------------------<  92  4.1   |  20<L>  |  0.68    Ca    8.9      13 Nov 2021 07:33    TPro  7.5  /  Alb  3.4  /  TBili  <0.2  /  DBili  x   /  AST  36  /  ALT  8<L>  /  AlkPhos  94  11-12    PT/INR - ( 12 Nov 2021 16:02 )   PT: 15.7 sec;   INR: 1.33          PTT - ( 12 Nov 2021 16:02 )  PTT:33.9 sec    CAPILLARY BLOOD GLUCOSE          RADIOLOGY & ADDITIONAL TESTS: Reviewed.   OVERNIGHT EVENTS: Pt admitted overnight. Afebrile and hemodynamically stable     SUBJECTIVE / INTERVAL HPI: Patient seen and examined at bedside. Endorses cough. No current fever, chills. Denies rash.     12 point ros negative except for above    VITAL SIGNS:  Vital Signs Last 24 Hrs  T(C): 36.9 (13 Nov 2021 06:03), Max: 36.9 (13 Nov 2021 06:03)  T(F): 98.4 (13 Nov 2021 06:03), Max: 98.4 (13 Nov 2021 06:03)  HR: 83 (13 Nov 2021 06:03) (76 - 118)  BP: 98/67 (13 Nov 2021 06:03) (95/61 - 100/66)  BP(mean): --  RR: 20 (13 Nov 2021 06:03) (18 - 20)  SpO2: 98% (13 Nov 2021 06:03) (98% - 100%)  I&O's Summary      PHYSICAL EXAM:    General: NAD, lying comfortably in bed   HEENT: Anicteric sclera, EOMI, MMM  Cardiovascular: +S1/S2; RRR; No JVD   Respiratory: Diminished breath sounds on right side   Gastrointestinal: soft, non-distended, no tenderness to palpation  Extremities: WWP; no edema, no erythema, no tenderness to palpation  Vascular: 2+ radial, DP/PT pulses B/L  Neurological: AAOx3; no focal deficits  Skin: No visible rash, skin discoloration    MEDICATIONS:  MEDICATIONS  (STANDING):  influenza   Vaccine 0.5 milliLiter(s) IntraMuscular once  sodium chloride 3%  Inhalation 5 milliLiter(s) Inhalation every 8 hours    MEDICATIONS  (PRN):  acetaminophen     Tablet .. 650 milliGRAM(s) Oral every 6 hours PRN Temp greater or equal to 38C (100.4F), Mild Pain (1 - 3)      ALLERGIES:  Allergies    No Known Allergies    Intolerances        LABS:                        9.9    5.67  )-----------( 402      ( 13 Nov 2021 07:33 )             33.7     11-13    136  |  107  |  7   ----------------------------<  92  4.1   |  20<L>  |  0.68    Ca    8.9      13 Nov 2021 07:33    TPro  7.5  /  Alb  3.4  /  TBili  <0.2  /  DBili  x   /  AST  36  /  ALT  8<L>  /  AlkPhos  94  11-12    PT/INR - ( 12 Nov 2021 16:02 )   PT: 15.7 sec;   INR: 1.33          PTT - ( 12 Nov 2021 16:02 )  PTT:33.9 sec    CAPILLARY BLOOD GLUCOSE          RADIOLOGY & ADDITIONAL TESTS: Reviewed.

## 2021-11-13 NOTE — CONSULT NOTE ADULT - SUBJECTIVE AND OBJECTIVE BOX
PULMONARY SERVICE INITIAL CONSULT NOTE    HPI:  Patient is a 20 y/o M, never smoker, w/ no pmhx presenting with complaints of cough x5 days and fevers with night sweats for 2 weeks. He has some chest pain when coughing but no hemoptysis. He also notes dyspnea on exertion that started 5 days ago. No SOB at rest. Denies any weight loss, recent travel. He states he was born in [place], moved to [facility] and then to [facility]. He has no history of known TB exposures. No abd pain, n/v/d, blood in stool, black stools, headache, or dizziness. 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 No other environmental exposures. Pulmonology consulted for pleural effusion and concern for TB.     In ED, vitals T 97.4, , /76, RR 18, O2 98% on RA  Labs significant for Hb 11.8, MCV 75.8, unknown baseline  EKG: sinus tachycardia, no ischemic changes    REVIEW OF SYSTEMS:  All additional ROS negative.    PAST MEDICAL & SURGICAL HISTORY:  No pertinent past medical history    No significant past surgical history        FAMILY HISTORY:  No pertinent family history in first degree relatives        SOCIAL HISTORY:  Smoking Status: [ ] Current, [ ] Former, [x ] Never  Pack Years:    MEDICATIONS:  Pulmonary:  sodium chloride 3%  Inhalation 5 milliLiter(s) Inhalation every 8 hours    Antimicrobials:    Anticoagulants:    Onc:    GI/:    Endocrine:    Cardiac:    Other Medications:  acetaminophen     Tablet .. 650 milliGRAM(s) Oral every 6 hours PRN  ferrous    sulfate 325 milliGRAM(s) Oral daily  influenza   Vaccine 0.5 milliLiter(s) IntraMuscular once  ondansetron Injectable 4 milliGRAM(s) IV Push every 6 hours PRN  oxycodone    5 mG/acetaminophen 325 mG 2 Tablet(s) Oral every 6 hours PRN      Allergies    No Known Allergies    Intolerances        Vital Signs Last 24 Hrs  T(C): 36.7 (13 Nov 2021 12:41), Max: 36.9 (13 Nov 2021 06:03)  T(F): 98 (13 Nov 2021 12:41), Max: 98.4 (13 Nov 2021 06:03)  HR: 98 (13 Nov 2021 12:41) (76 - 98)  BP: 92/61 (13 Nov 2021 12:41) (92/61 - 98/67)  BP(mean): --  RR: 16 (13 Nov 2021 12:41) (16 - 20)  SpO2: 100% (13 Nov 2021 12:41) (98% - 100%)    11-13 @ 07:01  -  11-13 @ 21:56  --------------------------------------------------------  IN: 0 mL / OUT: 1300 mL / NET: -1300 mL    PHYSICAL EXAM:  Constitutional: WDWN  Head: NC/AT  EENT: PERRL, anicteric sclera; oropharynx clear, MMM  Neck: supple, no appreciable JVD  Respiratory: Decreased breath sounds at right lung base, no W/R/R  Cardiovascular: +S1/S2, RRR  Gastrointestinal: soft, NT/ND  Extremities: WWP; no edema, clubbing or cyanosis  Vascular: 2+ radial pulses B/L  Neurological: awake and alert; RODRIGUEZ    LABS:  CBC Full  -  ( 13 Nov 2021 15:36 )  WBC Count : 5.04 K/uL  RBC Count : 5.19 M/uL  Hemoglobin : 12.1 g/dL  Hematocrit : 39.3 %  Platelet Count - Automated : 469 K/uL  Mean Cell Volume : 75.7 fl  Mean Cell Hemoglobin : 23.3 pg  Mean Cell Hemoglobin Concentration : 30.8 gm/dL  Auto Neutrophil # : x  Auto Lymphocyte # : x  Auto Monocyte # : x  Auto Eosinophil # : x  Auto Basophil # : x  Auto Neutrophil % : x  Auto Lymphocyte % : x  Auto Monocyte % : x  Auto Eosinophil % : x  Auto Basophil % : x    11-13    136  |  107  |  7   ----------------------------<  92  4.1   |  20<L>  |  0.68    Ca    8.9      13 Nov 2021 07:33    TPro  7.5  /  Alb  3.4  /  TBili  <0.2  /  DBili  x   /  AST  36  /  ALT  8<L>  /  AlkPhos  94  11-12  PT/INR - ( 12 Nov 2021 16:02 )   PT: 15.7 sec;   INR: 1.33     PTT - ( 12 Nov 2021 16:02 )  PTT:33.9 sec    RADIOLOGY & ADDITIONAL STUDIES:  < from: CT Chest No Cont (11.12.21 @ 17:34) >  Findings:    Lungs, airways and pleura:  Central airways are patent. There is traction bronchiectasis with adjacent linear bands of parenchymal scarring and/or atelectasis within the apical segment of the right upper lobe. There is a moderate-sized right pleural effusion which may be slightly loculated along the superior, anterior and medial aspect of the right upper lobe. Additional area of atelectasis and/or parenchymal scarring with some adjacent bronchiectasis is noted to involve the medial segment of the right middle lobe with some volume loss. Scattered tree-in-bud nodules are noted in the right upper lung zone compatible with small airways inflammation.    Mediastinum and hilar regions: No thoracic lymphadenopathy.    Heart and pericardium:  Heart size is normal. No pericardial effusion.    Vessels:  Normal.    Chest wall and lower neck:  Normal.    Upper abdomen: Normal.    Bones: Normal.      Impression:  1. Moderate-sized right pleural effusion which is mildly loculated anteriorly, superiorly and medially encasing the right upper lobe.  2. Linear parenchymal bands of scarring and/or atelectasis within the right upper and right middle lobes with adjacent traction bronchiectasis as well as scattered tree-in-bud micronodules. Abbreviated differential includes chronic infectious and/or inflammatory etiologies such as RALPH and in the proper clinical setting primary TB cannot be excluded.    The results of this examination were verbally communicated with read back to the physician, Director, on 11/12/2021 at 6:01 PM.    --- End of Report ---   PULMONARY SERVICE INITIAL CONSULT NOTE    HPI:  Patient is a 20 y/o M, never smoker, w/ no pmhx presenting with complaints of cough x5 days and fevers with night sweats for 2 weeks. He has some chest pain when coughing but no hemoptysis. He also notes dyspnea on exertion that started 5 days ago. No SOB at rest. Denies any weight loss, recent travel. He states he was born in [place], moved to [facility], [place] and then to [facility]. He has no history of known TB exposures. No abd pain, n/v/d, blood in stool, black stools, headache, or dizziness. 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 No other environmental exposures. Pulmonology consulted for pleural effusion and concern for TB.     In ED, vitals T 97.4, , /76, RR 18, O2 98% on RA  Labs significant for Hb 11.8, MCV 75.8, unknown baseline  EKG: sinus tachycardia, no ischemic changes    REVIEW OF SYSTEMS:  All additional ROS negative.    PAST MEDICAL & SURGICAL HISTORY:  No pertinent past medical history    No significant past surgical history    FAMILY HISTORY:  No pertinent family history in first degree relatives    SOCIAL HISTORY:  Smoking Status: [ ] Current, [ ] Former, [x ] Never  Pack Years:    MEDICATIONS:  Pulmonary:  sodium chloride 3%  Inhalation 5 milliLiter(s) Inhalation every 8 hours    Antimicrobials:    Anticoagulants:    Onc:    GI/:    Endocrine:    Cardiac:    Other Medications:  acetaminophen     Tablet .. 650 milliGRAM(s) Oral every 6 hours PRN  ferrous    sulfate 325 milliGRAM(s) Oral daily  influenza   Vaccine 0.5 milliLiter(s) IntraMuscular once  ondansetron Injectable 4 milliGRAM(s) IV Push every 6 hours PRN  oxycodone    5 mG/acetaminophen 325 mG 2 Tablet(s) Oral every 6 hours PRN      Allergies    No Known Allergies    Intolerances        Vital Signs Last 24 Hrs  T(C): 36.7 (13 Nov 2021 12:41), Max: 36.9 (13 Nov 2021 06:03)  T(F): 98 (13 Nov 2021 12:41), Max: 98.4 (13 Nov 2021 06:03)  HR: 98 (13 Nov 2021 12:41) (76 - 98)  BP: 92/61 (13 Nov 2021 12:41) (92/61 - 98/67)  BP(mean): --  RR: 16 (13 Nov 2021 12:41) (16 - 20)  SpO2: 100% (13 Nov 2021 12:41) (98% - 100%)    11-13 @ 07:01  -  11-13 @ 21:56  --------------------------------------------------------  IN: 0 mL / OUT: 1300 mL / NET: -1300 mL    PHYSICAL EXAM:  Constitutional: WDWN  Head: NC/AT  EENT: PERRL, anicteric sclera; oropharynx clear, MMM  Neck: supple, no appreciable JVD  Respiratory: Decreased breath sounds at right lung base, no W/R/R  Cardiovascular: +S1/S2, RRR  Gastrointestinal: soft, NT/ND  Extremities: WWP; no edema, clubbing or cyanosis  Vascular: 2+ radial pulses B/L  Neurological: awake and alert; RODRIGUEZ    LABS:  CBC Full  -  ( 13 Nov 2021 15:36 )  WBC Count : 5.04 K/uL  RBC Count : 5.19 M/uL  Hemoglobin : 12.1 g/dL  Hematocrit : 39.3 %  Platelet Count - Automated : 469 K/uL  Mean Cell Volume : 75.7 fl  Mean Cell Hemoglobin : 23.3 pg  Mean Cell Hemoglobin Concentration : 30.8 gm/dL  Auto Neutrophil # : x  Auto Lymphocyte # : x  Auto Monocyte # : x  Auto Eosinophil # : x  Auto Basophil # : x  Auto Neutrophil % : x  Auto Lymphocyte % : x  Auto Monocyte % : x  Auto Eosinophil % : x  Auto Basophil % : x    11-13    136  |  107  |  7   ----------------------------<  92  4.1   |  20<L>  |  0.68    Ca    8.9      13 Nov 2021 07:33    TPro  7.5  /  Alb  3.4  /  TBili  <0.2  /  DBili  x   /  AST  36  /  ALT  8<L>  /  AlkPhos  94  11-12  PT/INR - ( 12 Nov 2021 16:02 )   PT: 15.7 sec;   INR: 1.33     PTT - ( 12 Nov 2021 16:02 )  PTT:33.9 sec    RADIOLOGY & ADDITIONAL STUDIES:  < from: CT Chest No Cont (11.12.21 @ 17:34) >  Findings:    Lungs, airways and pleura:  Central airways are patent. There is traction bronchiectasis with adjacent linear bands of parenchymal scarring and/or atelectasis within the apical segment of the right upper lobe. There is a moderate-sized right pleural effusion which may be slightly loculated along the superior, anterior and medial aspect of the right upper lobe. Additional area of atelectasis and/or parenchymal scarring with some adjacent bronchiectasis is noted to involve the medial segment of the right middle lobe with some volume loss. Scattered tree-in-bud nodules are noted in the right upper lung zone compatible with small airways inflammation.    Mediastinum and hilar regions: No thoracic lymphadenopathy.    Heart and pericardium:  Heart size is normal. No pericardial effusion.    Vessels:  Normal.    Chest wall and lower neck:  Normal.    Upper abdomen: Normal.    Bones: Normal.      Impression:  1. Moderate-sized right pleural effusion which is mildly loculated anteriorly, superiorly and medially encasing the right upper lobe.  2. Linear parenchymal bands of scarring and/or atelectasis within the right upper and right middle lobes with adjacent traction bronchiectasis as well as scattered tree-in-bud micronodules. Abbreviated differential includes chronic infectious and/or inflammatory etiologies such as RALPH and in the proper clinical setting primary TB cannot be excluded.    The results of this examination were verbally communicated with read back to the physician, Director, on 11/12/2021 at 6:01 PM.    --- End of Report ---

## 2021-11-13 NOTE — PROGRESS NOTE ADULT - PROBLEM SELECTOR PLAN 3
- Hb 10.8 w/ MCV 75.8. No s/s of bleeding.   - f/u iron studies - Hb 10.8 w/ MCV 75.8. No s/s of bleeding.   - iron studies consistent with iron deficiency anemia   - ferrous sulfate 325mg daily

## 2021-11-13 NOTE — PROGRESS NOTE ADULT - ATTENDING COMMENTS
I agree with the above findings, assessment, and plan with the following additions and exceptions:     In brief,  this is a 21 year old male patient who [place] to [place] ago from [place] presented with worsening productive cough, GARCIA and night sweats with fevers for over a week. No recent travel. No significant family history. Non-smoker. CT chest shows moderate-sized right pleural effusion which is mildly loculated anteriorly, superiorly and medially encasing the right upper lobe and linear parenchymal bands of scarring and/or atelectasis within the right upper and right middle lobes with adjacent traction bronchiectasis as well as scattered tree-in-bud micronodules.      #Dyspnea on exertion   -CT imaging reviewed.  -Agree with monitoring off antimicrobials for now.  -Fungitell and galactomannan at lab however at this time AFB sputum cultures and possible pleural fluid sampling are likely to be of higher diagnostic yield at this time.   -Bcx and rvp negative. Urine legionella pending.   -Pulmonary consult pending. Assistance with case greatly appreciated.     #Iron deficiency anemia  -For now we favor PO Ferous sulfate with vit C.     Rest of plan as above    Dr. Rojelio Durand DO  Hospitalist  Division of Hospital Medicine  [facility]  Available via Microsoft Teams I agree with the above findings, assessment, and plan with the following additions and exceptions:     In brief,  this is a 21 year old male patient who [place] from [place] presented with worsening productive cough, GARCIA and night sweats with fevers for over a week. No recent travel. No significant family history. Non-smoker. CT chest shows moderate-sized right pleural effusion which is mildly loculated anteriorly, superiorly and medially encasing the right upper lobe and linear parenchymal bands of scarring and/or atelectasis within the right upper and right middle lobes with adjacent traction bronchiectasis as well as scattered tree-in-bud micronodules.      #Dyspnea on exertion   -CT imaging reviewed.  -Agree with monitoring off antimicrobials for now.  -Fungitell and galactomannan at lab however at this time AFB sputum cultures and possible pleural fluid sampling are likely to be of higher diagnostic yield at this time.   -Bcx and rvp negative. Urine legionella pending.   -Pulmonary consult pending. Assistance with case greatly appreciated.     #Iron deficiency anemia - asymptomatic.  -For now we favor PO Ferous sulfate with vit C.   -No s/s of blood on exam however we will repeat a CBC to ensure the hgb is stable.     Rest of plan as above    Dr. Rojelio Durand DO  Hospitalist  Division of Hospital Medicine  [facility]  Available via Microsoft Teams I agree with the above findings, assessment, and plan with the following additions and exceptions:     In brief,  this is a 21 year old male patient who [place] ago from [place] presented with worsening productive cough, GARCIA and night sweats with fevers for over a week. No recent travel. No significant family history. Non-smoker. CT chest shows moderate-sized right pleural effusion which is mildly loculated anteriorly, superiorly and medially encasing the right upper lobe and linear parenchymal bands of scarring and/or atelectasis within the right upper and right middle lobes with adjacent traction bronchiectasis as well as scattered tree-in-bud micronodules.      #Dyspnea on exertion   -CT imaging reviewed.  -Agree with monitoring off antimicrobials for now.  -Fungitell and galactomannan at lab.  -Bcx and rvp negative. Urine legionella pending.   -AFB sputum cultures x3 and possible pleural fluid sampling if pulmonary team is offering.   -Pulmonary consult pending. Assistance with case greatly appreciated.   -DDX is broad; we will defer additional workup pending pulm recs.     #Iron deficiency anemia - asymptomatic.  -For now we favor PO Ferous sulfate with vit C.   -No s/s of blood on exam however we will repeat a CBC to ensure the hgb is stable.     Rest of plan as above    Dr. Rojelio Durand DO  Hospitalist  Division of Hospital Medicine  [facility]  Available via Microsoft Teams

## 2021-11-13 NOTE — PROCEDURE NOTE - NSPROCDETAILS_GEN_ALL_CORE
Seldinger technique/dressing applied/secured in place/sterile dressing applied/thoracostomy tube placed percutaneously

## 2021-11-13 NOTE — DIETITIAN INITIAL EVALUATION ADULT. - PROBLEM SELECTOR PLAN 1
Pt presenting with cough x5 days with also associated fevers as high as 102 x2 weeks. No hemoptysis, weight loss or recent travel or hx of TB. [place]oved [place] from [place] 3 years ago.  - CT chest showing moderate-sized right pleural effusion which is mildly loculated anteriorly, superiorly and medially encasing the right upper lobe. Linear parenchymal bands of scarring and/or atelectasis within the right upper and right middle lobes with adjacent traction bronchiectasis as well as scattered tree-in-bud micronodules.   - CT concerning for infectious etiology including RALPH vs TB. Will r/o for TB  - will hold of on abx for now   - f/u procalcitonin, fungitell, aspergillus, sputum cx, AFB sputum culture  - f/u pulm recs

## 2021-11-13 NOTE — CONSULT NOTE ADULT - ASSESSMENT
Patient is a 22 y/o M, never smoker, w/ no PMHx presenting with complaints of cough, SOB for 5 days and fevers with night sweats for 2 weeks. CT with moderate sized pleural effusion, RUL cystic lesion and atelectatic changes to medial segment of RML. Pulmonology consulted for pleural effusion and concern for TB.     #Loculated Pleural Effusion  #Tree-in-Bud nodules  #Atelectasis  #RUL Cystic Lesion    Patient with fevers, chills, night sweats, cough, SOB for 5 days with moderate pleural effusion, cystic lesion and atelectasis on CT. Differentials include TB as patient is from endemic area, malignancy such as lymphoma, MAC vs fungal infection though this is less likely given lack of known immunodeficiency and no travel to endemic area of fungal infection. Now s/p chest tube insertion with 1300 cc cloudy serous fluid.    Recommend the following:    -keep chest tube to water seal for now. Will US in AM and determine if loculations are present that may require MIST-2 protocol  -will f/u pleural fluid studies including ADA and cytology  -obtain sputum AFB smear x3, can give hypertonic saline nebs for sputum induction if needed  -pain control for chest tube insertion    Pulmonary will follow.  Patient is a 20 y/o M, never smoker, w/ no PMHx presenting with complaints of cough, SOB for 5 days and fevers with night sweats for 2 weeks. CT with moderate sized pleural effusion, RUL cystic lesion and atelectatic changes to medial segment of RML. Pulmonology consulted for pleural effusion and concern for TB.     #Loculated Pleural Effusion  #Tree-in-Bud nodules  #Atelectasis  #RUL Cystic Lesion    Patient with fevers, chills, night sweats, cough, SOB for 5 days with moderate pleural effusion, cystic lesion and atelectasis on CT. Differentials include TB as patient is from endemic area, malignancy such as lymphoma, MAC vs fungal infection though this is less likely given lack of known immunodeficiency and no travel to endemic area of fungal infection. Now s/p chest tube insertion with 1300 cc cloudy serous fluid.    Recommend the following:    -keep chest tube to water seal for now. Will US in AM and determine if loculations are present that may require MIST-2 protocol  -will f/u pleural fluid studies including ADA and cytology  -obtain sputum AFB smear x3, sputum bacterial and fungal culture. Can give hypertonic saline nebs for sputum induction if needed  -pain control for chest tube insertion    Pulmonary will follow, continue excellent care per primary team.

## 2021-11-13 NOTE — DIETITIAN INITIAL EVALUATION ADULT. - ORAL NUTRITION SUPPLEMENTS
Recommend addition of Ensure Enlive BID (700 kcal, 40g protein, 360 mL free H2O), Recommend addition of MVI daily

## 2021-11-13 NOTE — DIETITIAN INITIAL EVALUATION ADULT. - PROBLEM SELECTOR PLAN 2
- moderate sized right pleural effusion  - ddx included likely infectious - bacterial vs tb/edgardo vs fungal, less likely malignancy or autoimmune  - f/u pulm recs for possible thoracentesis

## 2021-11-13 NOTE — PROGRESS NOTE ADULT - PROBLEM SELECTOR PLAN 4
F: none  E: replete prn  N: nutrition  DVT: improve score 0  Dispo: RMF F: none  E: replete prn  N: nutrition  DVT: None - improve score 0  Dispo: RMF

## 2021-11-13 NOTE — DIETITIAN NUTRITION RISK NOTIFICATION - ADDITIONAL COMMENTS/DIETITIAN RECOMMENDATIONS
1. Cont with regular diet  >> Recommend addition of Ensure Enlive BID (700 kcal, 40g protein, 360 mL free H2O)  >> Recommend addition of MVI daily  2. Pain and bowel regimen per team   3. Cont to monitor lytes and replete prn  4. RD diet edu prn

## 2021-11-13 NOTE — DIETITIAN INITIAL EVALUATION ADULT. - OTHER INFO
21M w/ no pmhx presenting with complaints of cough x5 days, found to have right sided pleural effusion. Pt admitted for further eval/management of pleural effusion and r/o TB.    On assessment, pt resting in bed without complaints. Currently on regular diet tolerating bowl of cereal this am. Pt had a few bites, noted he usually has a small appetite. No n/v/d/c. No abd distention or discomfort. Skin WDL, amy score 21. No pain noted at this time. Pt reporting following a regular diet at home (B: consumes tea and biscuits, L; chicken, D: michele with some rice). Per diet recall and portions mentioned, suspect pt meeting <75% of est needs for >3 months. Weight has remained stable. NFPE was remarkable for mild and moderate wasting. Per ASPEN guidelines, pt meets criteria for moderate malnutrition. Education provided on importance of adequate PO intake with focus placed on foods dense in kcal and pro since appetite remains limited. Pt onboard with addition of ONS to aid with overall PO intake. Please see nutr recs below. RD to follow.

## 2021-11-14 LAB
ALBUMIN SERPL ELPH-MCNC: 3.2 G/DL — LOW (ref 3.3–5)
ALP SERPL-CCNC: 92 U/L — SIGNIFICANT CHANGE UP (ref 40–120)
ALT FLD-CCNC: 7 U/L — LOW (ref 10–45)
ANION GAP SERPL CALC-SCNC: 11 MMOL/L — SIGNIFICANT CHANGE UP (ref 5–17)
APTT BLD: 33.4 SEC — SIGNIFICANT CHANGE UP (ref 27.5–35.5)
AST SERPL-CCNC: 14 U/L — SIGNIFICANT CHANGE UP (ref 10–40)
BASOPHILS # BLD AUTO: 0.02 K/UL — SIGNIFICANT CHANGE UP (ref 0–0.2)
BASOPHILS NFR BLD AUTO: 0.3 % — SIGNIFICANT CHANGE UP (ref 0–2)
BILIRUB SERPL-MCNC: 0.2 MG/DL — SIGNIFICANT CHANGE UP (ref 0.2–1.2)
BUN SERPL-MCNC: 7 MG/DL — SIGNIFICANT CHANGE UP (ref 7–23)
CALCIUM SERPL-MCNC: 9.1 MG/DL — SIGNIFICANT CHANGE UP (ref 8.4–10.5)
CHLORIDE SERPL-SCNC: 104 MMOL/L — SIGNIFICANT CHANGE UP (ref 96–108)
CHOLEST FLD-MCNC: 69 MG/DL — SIGNIFICANT CHANGE UP
CO2 SERPL-SCNC: 23 MMOL/L — SIGNIFICANT CHANGE UP (ref 22–31)
CREAT SERPL-MCNC: 0.71 MG/DL — SIGNIFICANT CHANGE UP (ref 0.5–1.3)
EOSINOPHIL # BLD AUTO: 0.17 K/UL — SIGNIFICANT CHANGE UP (ref 0–0.5)
EOSINOPHIL NFR BLD AUTO: 2.9 % — SIGNIFICANT CHANGE UP (ref 0–6)
GLUCOSE SERPL-MCNC: 99 MG/DL — SIGNIFICANT CHANGE UP (ref 70–99)
GRAM STN FLD: SIGNIFICANT CHANGE UP
HCT VFR BLD CALC: 37.7 % — LOW (ref 39–50)
HGB BLD-MCNC: 11.6 G/DL — LOW (ref 13–17)
IMM GRANULOCYTES NFR BLD AUTO: 0.3 % — SIGNIFICANT CHANGE UP (ref 0–1.5)
INR BLD: 1.34 — HIGH (ref 0.88–1.16)
LDH SERPL L TO P-CCNC: 155 U/L — SIGNIFICANT CHANGE UP (ref 50–242)
LYMPHOCYTES # BLD AUTO: 0.89 K/UL — LOW (ref 1–3.3)
LYMPHOCYTES # BLD AUTO: 15.4 % — SIGNIFICANT CHANGE UP (ref 13–44)
MAGNESIUM SERPL-MCNC: 2.1 MG/DL — SIGNIFICANT CHANGE UP (ref 1.6–2.6)
MCHC RBC-ENTMCNC: 23.2 PG — LOW (ref 27–34)
MCHC RBC-ENTMCNC: 30.8 GM/DL — LOW (ref 32–36)
MCV RBC AUTO: 75.6 FL — LOW (ref 80–100)
MONOCYTES # BLD AUTO: 0.68 K/UL — SIGNIFICANT CHANGE UP (ref 0–0.9)
MONOCYTES NFR BLD AUTO: 11.8 % — SIGNIFICANT CHANGE UP (ref 2–14)
NEUTROPHILS # BLD AUTO: 3.99 K/UL — SIGNIFICANT CHANGE UP (ref 1.8–7.4)
NEUTROPHILS NFR BLD AUTO: 69.3 % — SIGNIFICANT CHANGE UP (ref 43–77)
NIGHT BLUE STAIN TISS: SIGNIFICANT CHANGE UP
NIGHT BLUE STAIN TISS: SIGNIFICANT CHANGE UP
NRBC # BLD: 0 /100 WBCS — SIGNIFICANT CHANGE UP (ref 0–0)
PHOSPHATE SERPL-MCNC: 4.1 MG/DL — SIGNIFICANT CHANGE UP (ref 2.5–4.5)
PLATELET # BLD AUTO: 438 K/UL — HIGH (ref 150–400)
POTASSIUM SERPL-MCNC: 4.1 MMOL/L — SIGNIFICANT CHANGE UP (ref 3.5–5.3)
POTASSIUM SERPL-SCNC: 4.1 MMOL/L — SIGNIFICANT CHANGE UP (ref 3.5–5.3)
PROT SERPL-MCNC: 7.2 G/DL — SIGNIFICANT CHANGE UP (ref 6–8.3)
PROTHROM AB SERPL-ACNC: 15.9 SEC — HIGH (ref 10.6–13.6)
RBC # BLD: 4.99 M/UL — SIGNIFICANT CHANGE UP (ref 4.2–5.8)
RBC # FLD: 16.2 % — HIGH (ref 10.3–14.5)
SODIUM SERPL-SCNC: 138 MMOL/L — SIGNIFICANT CHANGE UP (ref 135–145)
SPECIMEN SOURCE FLD: SIGNIFICANT CHANGE UP
SPECIMEN SOURCE FLD: SIGNIFICANT CHANGE UP
SPECIMEN SOURCE: SIGNIFICANT CHANGE UP
TRIGL FLD-MCNC: 27 MG/DL — SIGNIFICANT CHANGE UP
WBC # BLD: 5.77 K/UL — SIGNIFICANT CHANGE UP (ref 3.8–10.5)
WBC # FLD AUTO: 5.77 K/UL — SIGNIFICANT CHANGE UP (ref 3.8–10.5)

## 2021-11-14 PROCEDURE — 99233 SBSQ HOSP IP/OBS HIGH 50: CPT | Mod: GC

## 2021-11-14 RX ORDER — SODIUM CHLORIDE 9 MG/ML
500 INJECTION, SOLUTION INTRAVENOUS ONCE
Refills: 0 | Status: DISCONTINUED | OUTPATIENT
Start: 2021-11-14 | End: 2021-11-14

## 2021-11-14 RX ADMIN — Medication 650 MILLIGRAM(S): at 21:12

## 2021-11-14 RX ADMIN — Medication 325 MILLIGRAM(S): at 16:26

## 2021-11-14 RX ADMIN — Medication 650 MILLIGRAM(S): at 21:42

## 2021-11-14 NOTE — PROGRESS NOTE ADULT - PROBLEM SELECTOR PLAN 3
No s/s of bleeding.   - iron studies consistent with iron deficiency anemia   - ferrous sulfate 325mg daily F: none  E: replete prn  N: nutrition  DVT: None - improve score 0  Dispo: RMF

## 2021-11-14 NOTE — PROGRESS NOTE ADULT - SUBJECTIVE AND OBJECTIVE BOX
INTERNAL MEDICINE PROGRESS NOTE    Dr. Rojelio Durand, DO  Hospitalist  Division of Hospital Medicine  [facility]  Available via [employer] Teams      SUBJECTIVE:  Dyspnea and night sweats improved per patient.  Pain controlled reasonably well.    REVIEW OF SYSTEMS   12 point review of systems negative except for above.     PAST MEDICAL & SURGICAL HISTORY:  No pertinent past medical history    No significant past surgical history        MEDICATIONS  (STANDING):  ferrous    sulfate 325 milliGRAM(s) Oral daily  influenza   Vaccine 0.5 milliLiter(s) IntraMuscular once    MEDICATIONS  (PRN):  acetaminophen     Tablet .. 650 milliGRAM(s) Oral every 6 hours PRN Temp greater or equal to 38C (100.4F), Mild Pain (1 - 3)  ondansetron Injectable 4 milliGRAM(s) IV Push every 6 hours PRN Nausea and/or Vomiting  oxycodone    5 mG/acetaminophen 325 mG 2 Tablet(s) Oral every 6 hours PRN Severe Pain (7 - 10)      Allergies    No Known Allergies    Intolerances        T(C): 36.4 (11-14-21 @ 05:28), Max: 36.7 (11-13-21 @ 12:41)  T(F): 97.6 (11-14-21 @ 05:28), Max: 98 (11-13-21 @ 12:41)  HR: 90 (11-14-21 @ 05:28) (82 - 98)  BP: 84/52 (11-14-21 @ 05:28) (84/52 - 94/60)  ABP: --  ABP(mean): --  RR: 19 (11-14-21 @ 05:28) (16 - 20)  SpO2: 100% (11-14-21 @ 05:28) (97% - 100%)      CONSTITUTIONAL: No acute distress.   HEENT:  Conjunctiva clear B/L.  Moist oral mucosa.   Cardiovascular: RRR with no murmurs. No JVD noted. No lower extremity edema B/L. Extremities are warm and well perfused. Radial pulses 2+ B/L. Dorsalis pedis pulses 2+ B/L.    Respiratory: Lungs CTAB. No wrr. No accessory muscle use. Right chest tube open to gravity.   Gastrointestinal:  Soft, nontender. Non-distended. Non-rigid. No CVA tenderness B/L.  MSK:  No joint swelling. No joint erythema B/L. No midline spinal tenderness.  Neurologic:  Alert and awake. Moving all extremities. Following commands. Making eye contact.    Skin:  No rashes noted. No skin erythema noted.   Psych:  Normal affect. Normal Mood.     LABS                        11.6   5.77  )-----------( 438      ( 14 Nov 2021 06:26 )             37.7     11-14    138  |  104  |  7   ----------------------------<  99  4.1   |  23  |  0.71    Ca    9.1      14 Nov 2021 06:26  Phos  4.1     11-14  Mg     2.1     11-14    TPro  7.2  /  Alb  3.2<L>  /  TBili  0.2  /  DBili  x   /  AST  14  /  ALT  7<L>  /  AlkPhos  92  11-14    PT/INR - ( 14 Nov 2021 06:26 )   PT: 15.9 sec;   INR: 1.34          PTT - ( 14 Nov 2021 06:26 )  PTT:33.4 sec      ALL RECENT STUDIES REVIEWED INCLUDING REPORTS AVAILABLE   CXR: Right chest tube with trace residual pl effusion    CARE DISCUSSED WITH ALL CONSULTANTS  Pulm: residual loculated effusion on pocus

## 2021-11-14 NOTE — PROGRESS NOTE ADULT - ASSESSMENT
21 year old male patient who immigrated to [place] years ago from [place] presented with worsening productive cough, GARCIA and night sweats with fevers for over a week. No recent travel. No significant family history. Non-smoker. CT chest shows moderate-sized right pleural effusion which is mildly loculated, linear parenchymal bands of scarring and/or atelectasis within the right upper and right middle lobes with adjacent traction bronchiectasis as well as scattered tree-in-bud micronodules.

## 2021-11-14 NOTE — PROGRESS NOTE ADULT - PROBLEM SELECTOR PLAN 2
-Exudative   -S/p chest tube w/ 1.3L of cloudy serous fluid drained; pulm input appreciated.   -Residual loculated effusion noted by pulm; further recs to follow.  -Sputum AFB x3.  -F/u pleural ada, cytology, and cultures.  -Monitoring off antimicrobials for now.  -Fungitell and galactomannan at lab.  -Bcx and rvp negative. Urine legionella negative.  -Rx for pain. No s/s of bleeding.   - iron studies consistent with iron deficiency anemia   - ferrous sulfate 325mg daily

## 2021-11-14 NOTE — PROGRESS NOTE ADULT - PROBLEM SELECTOR PLAN 1
Related to pleural and parenchymal findings.  Mgmt as below. -Exudative  -S/p chest tube w/ 1.3L of cloudy serous fluid drained; pulm input appreciated.   -Monitor output.  -Residual loculated effusion noted by pulm; further recs to follow.  -Sputum AFB x3.  -F/u pleural ada, cytology, and cultures.  -Monitoring off antimicrobials for now.  -Fungitell and galactomannan at lab.  -Bcx and rvp negative. Urine legionella negative.  -Rx for pain.

## 2021-11-14 NOTE — PROGRESS NOTE ADULT - ASSESSMENT
Patient is a 20 y/o M, never smoker, w/ no PMHx presenting with complaints of cough, SOB for 5 days and fevers with night sweats for 2 weeks. CT with moderate sized pleural effusion, RUL cystic lesion and atelectatic changes to medial segment of RML. Pulmonology consulted for pleural effusion and concern for TB.     #Loculated Pleural Effusion  #Tree-in-Bud nodules  #Atelectasis  #RUL Cystic Lesion    Patient with fevers, chills, night sweats, cough, SOB for 5 days with moderate pleural effusion, cystic lesion and atelectasis on CT. Differentials include TB as patient is from endemic area, malignancy such as lymphoma, MAC vs fungal infection though this is less likely given lack of known immunodeficiency and no travel to endemic area of fungal infection. Now s/p chest tube insertion with 1300 cc cloudy serous fluid. Bedside US show small pleural fluid remaining with sediment.     Recommend the following:    -100 cc output overnight keep chest tube to water seal for now.   -pleural fluid is exudative by light's criteria  -will f/u pleural fluid studies including ADA and cytology  -obtain sputum AFB smear x3, sputum bacterial and fungal culture. Can give hypertonic saline nebs for sputum induction if needed  -pain control for chest tube insertion  -will discuss further workup including pleural biopsy vs TPA/Dornase with IP tomorrow    Pulmonary will follow, continue excellent care per primary team.

## 2021-11-14 NOTE — PROGRESS NOTE ADULT - SUBJECTIVE AND OBJECTIVE BOX
PULMONARY CONSULT SERVICE FOLLOW-UP NOTE    INTERVAL HPI:  Reviewed chart and overnight events; patient seen and examined at bedside. States he has some discomfort near the chest tube site. No new complaint.     MEDICATIONS:  Pulmonary:    Antimicrobials:    Anticoagulants:    Cardiac:      Allergies    No Known Allergies    Intolerances        Vital Signs Last 24 Hrs  T(C): 36.7 (14 Nov 2021 12:51), Max: 36.7 (13 Nov 2021 21:08)  T(F): 98.1 (14 Nov 2021 12:51), Max: 98.1 (14 Nov 2021 12:51)  HR: 108 (14 Nov 2021 12:51) (82 - 108)  BP: 97/71 (14 Nov 2021 12:51) (84/52 - 97/71)  BP(mean): --  RR: 20 (14 Nov 2021 12:51) (19 - 20)  SpO2: 98% (14 Nov 2021 12:51) (97% - 100%)    11-13 @ 07:01  -  11-14 @ 07:00  --------------------------------------------------------  IN: 0 mL / OUT: 1300 mL / NET: -1300 mL    11-14 @ 07:01  -  11-14 @ 21:04  --------------------------------------------------------  IN: 0 mL / OUT: 200 mL / NET: -200 mL    PHYSICAL EXAM:  Constitutional: WDWN  HEENT: NC/AT; PERRL, anicteric sclera; MMM  Neck: supple  Cardiovascular: +S1/S2, RRR  Respiratory: CTA B/L; no W/R/R  Chest: Chest tube site CDI  Gastrointestinal: soft, NT/ND  Extremities: WWP; no edema, clubbing or cyanosis  Vascular: 2+ radial pulses B/L  Neurological: awake and alert; RODRIGUEZ    LABS:  CBC Full  -  ( 14 Nov 2021 06:26 )  WBC Count : 5.77 K/uL  RBC Count : 4.99 M/uL  Hemoglobin : 11.6 g/dL  Hematocrit : 37.7 %  Platelet Count - Automated : 438 K/uL  Mean Cell Volume : 75.6 fl  Mean Cell Hemoglobin : 23.2 pg  Mean Cell Hemoglobin Concentration : 30.8 gm/dL  Auto Neutrophil # : 3.99 K/uL  Auto Lymphocyte # : 0.89 K/uL  Auto Monocyte # : 0.68 K/uL  Auto Eosinophil # : 0.17 K/uL  Auto Basophil # : 0.02 K/uL  Auto Neutrophil % : 69.3 %  Auto Lymphocyte % : 15.4 %  Auto Monocyte % : 11.8 %  Auto Eosinophil % : 2.9 %  Auto Basophil % : 0.3 %    11-14    138  |  104  |  7   ----------------------------<  99  4.1   |  23  |  0.71    Ca    9.1      14 Nov 2021 06:26  Phos  4.1     11-14  Mg     2.1     11-14    TPro  7.2  /  Alb  3.2<L>  /  TBili  0.2  /  DBili  x   /  AST  14  /  ALT  7<L>  /  AlkPhos  92  11-14  PT/INR - ( 14 Nov 2021 06:26 )   PT: 15.9 sec;   INR: 1.34     PTT - ( 14 Nov 2021 06:26 )  PTT:33.4 sec    RADIOLOGY & ADDITIONAL STUDIES:

## 2021-11-15 ENCOUNTER — RESULT REVIEW (OUTPATIENT)
Age: 21
End: 2021-11-15

## 2021-11-15 LAB
B PERT IGG+IGM PNL SER: SIGNIFICANT CHANGE UP
COLOR FLD: YELLOW — SIGNIFICANT CHANGE UP
COMMENT - FLUIDS: SIGNIFICANT CHANGE UP
CULTURE RESULTS: SIGNIFICANT CHANGE UP
CULTURE RESULTS: SIGNIFICANT CHANGE UP
FLUID INTAKE SUBSTANCE CLASS: SIGNIFICANT CHANGE UP
FLUID SEGMENTED GRANULOCYTES: 12 % — SIGNIFICANT CHANGE UP
GRAM STN FLD: SIGNIFICANT CHANGE UP
GRAM STN FLD: SIGNIFICANT CHANGE UP
LYMPHOCYTES # FLD: 68 % — SIGNIFICANT CHANGE UP
MESOTHL CELL # FLD: 2 % — SIGNIFICANT CHANGE UP
MONOS+MACROS # FLD: 18 % — SIGNIFICANT CHANGE UP
NIGHT BLUE STAIN TISS: SIGNIFICANT CHANGE UP
NIGHT BLUE STAIN TISS: SIGNIFICANT CHANGE UP
RCV VOL RI: 3000 /UL — HIGH (ref 0–0)
SPECIMEN SOURCE FLD: SIGNIFICANT CHANGE UP
SPECIMEN SOURCE: SIGNIFICANT CHANGE UP
TOTAL NUCLEATED CELL COUNT, BODY FLUID: 3656 /UL — SIGNIFICANT CHANGE UP
TUBE TYPE: SIGNIFICANT CHANGE UP

## 2021-11-15 PROCEDURE — 99255 IP/OBS CONSLTJ NEW/EST HI 80: CPT | Mod: GC

## 2021-11-15 PROCEDURE — 88305 TISSUE EXAM BY PATHOLOGIST: CPT | Mod: 26

## 2021-11-15 PROCEDURE — 99232 SBSQ HOSP IP/OBS MODERATE 35: CPT | Mod: GC

## 2021-11-15 PROCEDURE — 88112 CYTOPATH CELL ENHANCE TECH: CPT | Mod: 26

## 2021-11-15 PROCEDURE — 99233 SBSQ HOSP IP/OBS HIGH 50: CPT | Mod: GC

## 2021-11-15 PROCEDURE — 88312 SPECIAL STAINS GROUP 1: CPT | Mod: 26

## 2021-11-15 RX ADMIN — Medication 650 MILLIGRAM(S): at 06:04

## 2021-11-15 RX ADMIN — Medication 325 MILLIGRAM(S): at 12:28

## 2021-11-15 NOTE — CONSULT NOTE ADULT - SUBJECTIVE AND OBJECTIVE BOX
Patient is a 21y old  Male who presents with a chief complaint of Pleural Effusion (15 Nov 2021 08:30)    HPI:  Pt is a 22 y/o M w/ no pmhx presenting with complaints of cough x5 days. Denies any blood in sputum. Reports associated chest pain only when he coughs. He states he also has some SOB on exertion, feels comfortable at rest. Pt also reports fevers only at night time for the past 2 weeks with associated night sweats. He states fever were as high as 102. Denies any weight loss, recent travel. He states he was born in [place] and moved to the [place] 3 years ago. Denies any hx of TB/exposures. No abd pain, n/v/d, blood in stool, black stools, headache, or dizziness.     In ED, vitals T 97.4, , /76, RR 18, O2 98% on RA  Labs significant for Hb 11.8, MCV 75.8, unknown baseline  EKG: sinus tachycardia, no ischemic changes  Imaging: CT chest no contrast: Impression:  1. Moderate-sized right pleural effusion which is mildly loculated anteriorly, superiorly and medially encasing the right upper lobe.  2. Linear parenchymal bands of scarring and/or atelectasis within the right upper and right middle lobes with adjacent traction bronchiectasis as well as scattered tree-in-bud micronodules. Abbreviated differential includes chronic infectious and/or inflammatory etiologies such as RALPH and in the proper clinical setting primary TB cannot be excluded.  ED management: s/p 1.5 L NS, tylenol, pulm consulted.   (12 Nov 2021 20:17)    INFECTIOUS DISEASES FOLLOW UP    This is a follow up note for this  21yMale with  ABNORMAL XRAY    OVERNIGHT EVENTS/INTERVAL HPI: No complaints. Denies f/c, CP, SOB, n/v/d/c. Resting comfortably.    ROS:  negative except as above    Allergies    No Known Allergies    Intolerances        ANTIBIOTICS/RELEVANT:  antimicrobials    immunologic:  influenza   Vaccine 0.5 milliLiter(s) IntraMuscular once    OTHER:  acetaminophen     Tablet .. 650 milliGRAM(s) Oral every 6 hours PRN  ferrous    sulfate 325 milliGRAM(s) Oral daily  ondansetron Injectable 4 milliGRAM(s) IV Push every 6 hours PRN      Objective:  Vital Signs Last 24 Hrs  T(C): 37 (15 Nov 2021 12:30), Max: 37.4 (14 Nov 2021 21:03)  T(F): 98.6 (15 Nov 2021 12:30), Max: 99.4 (14 Nov 2021 21:03)  HR: 107 (15 Nov 2021 12:30) (96 - 109)  BP: 94/63 (15 Nov 2021 12:30) (89/62 - 98/61)  BP(mean): --  RR: 16 (15 Nov 2021 12:30) (16 - 20)  SpO2: 99% (15 Nov 2021 12:30) (96% - 99%)    PHYSICAL EXAM:  General: Thin, NAD, lying comfortably in bed   HEENT: Anicteric sclera, EOMI, MMM  Cardiovascular: +S1/S2; RRR; No JVD   Respiratory: CTAB, no accessory muscle use  Gastrointestinal: soft, non-distended, no tenderness to palpation  Extremities: WWP; no edema, no erythema, no tenderness to palpation  Vascular: 2+ radial, DP/PT pulses B/L  Neurological: AAOx3; no focal deficits  Skin: No visible rash, skin discoloration    LABS:                        11.6   5.77  )-----------( 438      ( 14 Nov 2021 06:26 )             37.7     11-14    138  |  104  |  7   ----------------------------<  99  4.1   |  23  |  0.71    Ca    9.1      14 Nov 2021 06:26  Phos  4.1     11-14  Mg     2.1     11-14    TPro  7.2  /  Alb  3.2<L>  /  TBili  0.2  /  DBili  x   /  AST  14  /  ALT  7<L>  /  AlkPhos  92  11-14    PT/INR - ( 14 Nov 2021 06:26 )   PT: 15.9 sec;   INR: 1.34          PTT - ( 14 Nov 2021 06:26 )  PTT:33.4 sec      MICROBIOLOGY:  Culture Results:   Sputum specimen rejected.  Microscopic examination indicates  oropharyngeal contamination.  Please repeat. (11-14 @ 21:01)            RECENT CULTURES:  11-15 @ 04:05  .Sputum Sputum  --  --  --  --  --  11-14 @ 21:01  .Sputum Sputum  --  --  --    Sputum specimen rejected.  Microscopic examination indicates  oropharyngeal contamination.  Please repeat.  --  11-14 @ 20:55  .Sputum Sputum  --  --  --  --  --  11-14 @ 06:33  .Sputum Sputum  --  --  --    Testing in progress  --  11-13 @ 21:56  Pleural Fl right pleural effusion  --  --  --    No growth to date.  --  11-13 @ 15:42  .Sputum Sputum  --  --  --    Sputum specimen rejected.  Microscopic examination indicates  oropharyngeal contamination.  Please repeat.  --  11-12 @ 21:12  .Blood Blood  --  --  --    No growth at 2 days.  --      RADIOLOGY & ADDITIONAL STUDIES:

## 2021-11-15 NOTE — CONSULT NOTE ADULT - ATTENDING COMMENTS
Agree with above. Pleural TB is top on my list of infectious causes given lymphocytic effusion, positive quantiferon, and epidemiological exposure.  AFB sputum is smear negative x 3 and AFB pleural fluid culture is smear negative.  Smear negativity does not rule out TB.  It could still grow on cultures which take 6 weeks to finalize.    I have requested MTB PCR testing (Xpert MTB/RIF Assay) on both the pleural fluid sample and one of the sputum cultures.  Follow up Pleural fluid ADA results.  Based on these results, it may be reasonable to start empiric treatment while cultures given high pretest probability.  Pleural biopsy may be indicated if all the above are negative and diagnosis unclear.    Can keep on airborne precautions for now.     Do not treat for latent TB at this time

## 2021-11-15 NOTE — PROGRESS NOTE ADULT - SUBJECTIVE AND OBJECTIVE BOX
OVERNIGHT EVENTS: No acute event sover    SUBJECTIVE / INTERVAL HPI: Patient seen and examined at bedside. Endorses soreness around chest tube site. No additional complaints. Denies cough, fevers, chills    ROS: 12pt ROS otherwise negative unless stated above.     VITAL SIGNS:  Vital Signs Last 24 Hrs  T(C): 36.7 (15 Nov 2021 07:28), Max: 37.4 (14 Nov 2021 21:03)  T(F): 98.1 (15 Nov 2021 07:28), Max: 99.4 (14 Nov 2021 21:03)  HR: 96 (15 Nov 2021 07:28) (96 - 109)  BP: 89/62 (15 Nov 2021 07:28) (89/62 - 98/61)  BP(mean): --  RR: 20 (15 Nov 2021 07:28) (20 - 20)  SpO2: 96% (15 Nov 2021 07:28) (96% - 98%)  I&O's Summary    14 Nov 2021 07:01  -  15 Nov 2021 07:00  --------------------------------------------------------  IN: 0 mL / OUT: 200 mL / NET: -200 mL        PHYSICAL EXAM:    General: Thin, NAD, lying comfortably in bed   HEENT: Anicteric sclera, EOMI, MMM  Cardiovascular: +S1/S2; RRR; No JVD   Respiratory: CTAB, no accessory muscle use  Gastrointestinal: soft, non-distended, no tenderness to palpation  Extremities: WWP; no edema, no erythema, no tenderness to palpation  Vascular: 2+ radial, DP/PT pulses B/L  Neurological: AAOx3; no focal deficits  Skin: No visible rash, skin discoloration    MEDICATIONS:  MEDICATIONS  (STANDING):  ferrous    sulfate 325 milliGRAM(s) Oral daily  influenza   Vaccine 0.5 milliLiter(s) IntraMuscular once    MEDICATIONS  (PRN):  acetaminophen     Tablet .. 650 milliGRAM(s) Oral every 6 hours PRN Temp greater or equal to 38C (100.4F), Mild Pain (1 - 3)  ondansetron Injectable 4 milliGRAM(s) IV Push every 6 hours PRN Nausea and/or Vomiting      ALLERGIES:  Allergies    No Known Allergies    Intolerances        LABS:                        11.6   5.77  )-----------( 438      ( 14 Nov 2021 06:26 )             37.7     11-14    138  |  104  |  7   ----------------------------<  99  4.1   |  23  |  0.71    Ca    9.1      14 Nov 2021 06:26  Phos  4.1     11-14  Mg     2.1     11-14    TPro  7.2  /  Alb  3.2<L>  /  TBili  0.2  /  DBili  x   /  AST  14  /  ALT  7<L>  /  AlkPhos  92  11-14    PT/INR - ( 14 Nov 2021 06:26 )   PT: 15.9 sec;   INR: 1.34          PTT - ( 14 Nov 2021 06:26 )  PTT:33.4 sec    CAPILLARY BLOOD GLUCOSE          RADIOLOGY & ADDITIONAL TESTS: Reviewed.

## 2021-11-15 NOTE — PROGRESS NOTE ADULT - ASSESSMENT
Patient is a 20 y/o M, never smoker, w/ no PMHx presenting with complaints of cough, SOB for 5 days and fevers with night sweats for 2 weeks. CT with moderate sized pleural effusion, RUL cystic lesion and atelectatic changes to medial segment of RML. Pulmonology consulted for pleural effusion and concern for TB.     #Loculated Pleural Effusion  #Tree-in-Bud nodules  #Atelectasis  #RUL Cystic Lesion    Patient with fevers, chills, night sweats, cough, SOB for 5 days with moderate pleural effusion, cystic lesion and atelectasis on CT. Differentials include TB as patient is from endemic area, malignancy such as lymphoma, MAC vs fungal infection though this is less likely given lack of known immunodeficiency and no travel to endemic area of fungal infection. Now s/p chest tube insertion with 1300 cc cloudy serous fluid. Bedside US show small pleural fluid remaining with sediment. S/p removal of chest tube, pleural fluid is exudative by light's criteria. 3x sputum AFB smears are negative.    Recommend the following:    -will f/u pleural fluid studies including ADA and cytology  -discussed patient with ID, we will run TB PCR on pleural fluid  -will discuss further need for biopsy via VATs depending on these test results    Pulmonary will follow, continue excellent care per primary team.

## 2021-11-15 NOTE — PROGRESS NOTE ADULT - SUBJECTIVE AND OBJECTIVE BOX
PULMONARY CONSULT SERVICE FOLLOW-UP NOTE    INTERVAL HPI:  Reviewed chart and overnight events; patient seen and examined at bedside. States his chest is feeling better after having the fluid removed. No new complaints.     MEDICATIONS:  Pulmonary:    Antimicrobials:    Anticoagulants:    Cardiac:      Allergies    No Known Allergies    Intolerances    Vital Signs Last 24 Hrs  T(C): 37 (15 Nov 2021 12:30), Max: 37.4 (14 Nov 2021 21:03)  T(F): 98.6 (15 Nov 2021 12:30), Max: 99.4 (14 Nov 2021 21:03)  HR: 107 (15 Nov 2021 12:30) (96 - 109)  BP: 94/63 (15 Nov 2021 12:30) (89/62 - 98/61)  BP(mean): --  RR: 16 (15 Nov 2021 12:30) (16 - 20)  SpO2: 99% (15 Nov 2021 12:30) (96% - 99%)    11-14 @ 07:01  -  11-15 @ 07:00  --------------------------------------------------------  IN: 0 mL / OUT: 200 mL / NET: -200 mL    PHYSICAL EXAM:  Constitutional: WDWN  HEENT: NC/AT; PERRL, anicteric sclera; MMM  Neck: supple  Cardiovascular: +S1/S2, RRR  Respiratory: CTA B/L; no W/R/R  Gastrointestinal: soft, NT/ND  Extremities: WWP; no edema, clubbing or cyanosis  Vascular: 2+ radial pulses B/L  Neurological: awake and alert; RODRIGUEZ    LABS:  CBC Full  -  ( 14 Nov 2021 06:26 )  WBC Count : 5.77 K/uL  RBC Count : 4.99 M/uL  Hemoglobin : 11.6 g/dL  Hematocrit : 37.7 %  Platelet Count - Automated : 438 K/uL  Mean Cell Volume : 75.6 fl  Mean Cell Hemoglobin : 23.2 pg  Mean Cell Hemoglobin Concentration : 30.8 gm/dL  Auto Neutrophil # : 3.99 K/uL  Auto Lymphocyte # : 0.89 K/uL  Auto Monocyte # : 0.68 K/uL  Auto Eosinophil # : 0.17 K/uL  Auto Basophil # : 0.02 K/uL  Auto Neutrophil % : 69.3 %  Auto Lymphocyte % : 15.4 %  Auto Monocyte % : 11.8 %  Auto Eosinophil % : 2.9 %  Auto Basophil % : 0.3 %    11-14    138  |  104  |  7   ----------------------------<  99  4.1   |  23  |  0.71    Ca    9.1      14 Nov 2021 06:26  Phos  4.1     11-14  Mg     2.1     11-14    TPro  7.2  /  Alb  3.2<L>  /  TBili  0.2  /  DBili  x   /  AST  14  /  ALT  7<L>  /  AlkPhos  92  11-14  PT/INR - ( 14 Nov 2021 06:26 )   PT: 15.9 sec;   INR: 1.34     PTT - ( 14 Nov 2021 06:26 )  PTT:33.4 sec    RADIOLOGY & ADDITIONAL STUDIES:

## 2021-11-15 NOTE — PROGRESS NOTE ADULT - PROBLEM SELECTOR PLAN 1
Pt presenting with cough x5 days with also associated fevers as high as 102 x2 weeks. No hemoptysis, weight loss or recent travel or hx of TB. [place]oved [place] 3 years ago. Fount to have a moderate right sided effusion w/ loculations on CT scan and ultrasound, concerning for RALPH vs. TB vs malignancy/autoimmune disease     - no antibiotics currently recommended  - s/p chest tube placement w/ drainage of serous fluid.   - Pleural fluid analysis consistent w/ exudative effusion   - 1/3 AFB smears negative, procalcitonin elevated to 0.18  - f/u fungitell, aspergillus, sputum cx  - Pulm following - consider pleural biopsy and TPA/Dornase for further management

## 2021-11-15 NOTE — CONSULT NOTE ADULT - TIME BILLING
Patient seen and examined with house-staff during bedside rounds.  Resident note read, including vitals, physical findings, laboratory data, and radiological reports.   Revisions included below.  Direct personal management at bed side and extensive interpretation of the data.  Plan was outlined and discussed in details with the housestaff.  Decision making of high complexity  Action taken for acute disease activity to reflect the level of care provided:  - medication reconciliation  - review laboratory dataThe patient presented with large right pleural effusion.  Patient extensive travels to endemic areas.  Differential diagnoses include but not limited to TB tuberculosis.  I explained to the family indication benefit risk and the procedure of chest tube drainage and they consented for it.
evaluation for active TB

## 2021-11-15 NOTE — CONSULT NOTE ADULT - ASSESSMENT
21M from [facility] with no PMHx, p/w 2 week h/o fever and night sweats and 5-day history of productive cough, found to have right sided loculated pleural effusion, tree-in-bud nodules, and brochiectasis on CT scan, as well as positive quantiferon-TB gold test. Now s/p chest tube drainage of 1.3L serous cloudy fluid. Patient has lived in [place] and [place] [facility] in the past as well. Denies hemoptysis, weight loss, or history of TB. Patient did receive the BCG vaccine. Patient reports he had never had a PPD or QFT test before. He says he did have a X-ray to rule out TB a year ago. Sputum AFB smears have been negative x3. Disease presentation is concerning for active TB vs. latent TB vs. fungal infection vs. lymphoma vs. RALPH. AFB cultures are pending, likely will take 4-6 weeks to result. Cannot start latent TB treatment at this time because high suspicion for active TB, and latent TB treatment could create resistance to RIPE therapy.    Recommend:  1. Send HIV Ag/Ab test  2. Send Strep urine Ag test  3. No antibiotics at this time  4. F/u AFB culture  5. F/u Fungitell, Aspergillus Galactomannan Ag, ADA    ID Team 1 following 21M from [facility] with no PMHx, p/w 2 week h/o fever and night sweats and 5-day history of productive cough, found to have right sided loculated pleural effusion, tree-in-bud nodules, and brochiectasis on CT scan, as well as positive quantiferon-TB gold test. Now s/p chest tube drainage of 1.3L serous cloudy fluid. Patient has lived in [place] and [place] [facility] in the past as well. Denies hemoptysis, weight loss, or history of TB. Patient did receive the BCG vaccine. Patient reports he had never had a PPD or QFT test before. He says he did have a X-ray to rule out TB a year ago. Sputum AFB smears have been negative x3. Disease presentation is concerning for active TB vs. latent TB vs. fungal infection vs. lymphoma vs. RALPH. AFB cultures are pending, likely will take 4-6 weeks to result. Cannot start latent TB treatment at this time because high suspicion for active TB, and latent TB treatment could risk development of drug-resistant TB.    Recommend:  1. Send HIV Ag/Ab test  2. Send Strep urine Ag test  3. No antibiotics at this time  4. F/u AFB culture  5. F/u Fungitell, Aspergillus Galactomannan Ag, ADA    ID Team 1 following

## 2021-11-15 NOTE — PROGRESS NOTE ADULT - PROBLEM SELECTOR PLAN 2
- Hb 10.8 w/ MCV 75.8. No s/s of bleeding.   - iron studies consistent with iron deficiency anemia   - ferrous sulfate 325mg daily

## 2021-11-16 LAB
ANION GAP SERPL CALC-SCNC: 12 MMOL/L — SIGNIFICANT CHANGE UP (ref 5–17)
BUN SERPL-MCNC: 18 MG/DL — SIGNIFICANT CHANGE UP (ref 7–23)
CALCIUM SERPL-MCNC: 9.2 MG/DL — SIGNIFICANT CHANGE UP (ref 8.4–10.5)
CHLORIDE SERPL-SCNC: 99 MMOL/L — SIGNIFICANT CHANGE UP (ref 96–108)
CO2 SERPL-SCNC: 24 MMOL/L — SIGNIFICANT CHANGE UP (ref 22–31)
CREAT SERPL-MCNC: 0.75 MG/DL — SIGNIFICANT CHANGE UP (ref 0.5–1.3)
DEPRECATED M TB RPOB XXX QL NAA+PROBE: SIGNIFICANT CHANGE UP
DEPRECATED M TB RPOB XXX QL NAA+PROBE: SIGNIFICANT CHANGE UP
FUNGITELL: <31 PG/ML — SIGNIFICANT CHANGE UP
GLUCOSE SERPL-MCNC: 94 MG/DL — SIGNIFICANT CHANGE UP (ref 70–99)
HCT VFR BLD CALC: 38.4 % — LOW (ref 39–50)
HGB BLD-MCNC: 11.9 G/DL — LOW (ref 13–17)
M TB CMPLX DNA SPT/BRO QL NAA+PROBE: SIGNIFICANT CHANGE UP
MAGNESIUM SERPL-MCNC: 2 MG/DL — SIGNIFICANT CHANGE UP (ref 1.6–2.6)
MCHC RBC-ENTMCNC: 22.8 PG — LOW (ref 27–34)
MCHC RBC-ENTMCNC: 31 GM/DL — LOW (ref 32–36)
MCV RBC AUTO: 73.6 FL — LOW (ref 80–100)
MTB RIF RES GENE SPT NAA+PROBE: DETECTED
MTB RIF RES GENE SPT NAA+PROBE: SIGNIFICANT CHANGE UP
NIGHT BLUE STAIN TISS: SIGNIFICANT CHANGE UP
NON-GYNECOLOGICAL CYTOLOGY STUDY: SIGNIFICANT CHANGE UP
NRBC # BLD: 0 /100 WBCS — SIGNIFICANT CHANGE UP (ref 0–0)
PHOSPHATE SERPL-MCNC: 4.4 MG/DL — SIGNIFICANT CHANGE UP (ref 2.5–4.5)
PLATELET # BLD AUTO: 399 K/UL — SIGNIFICANT CHANGE UP (ref 150–400)
POTASSIUM SERPL-MCNC: 4 MMOL/L — SIGNIFICANT CHANGE UP (ref 3.5–5.3)
POTASSIUM SERPL-SCNC: 4 MMOL/L — SIGNIFICANT CHANGE UP (ref 3.5–5.3)
RBC # BLD: 5.22 M/UL — SIGNIFICANT CHANGE UP (ref 4.2–5.8)
RBC # FLD: 15.8 % — HIGH (ref 10.3–14.5)
RIFAMPIN PCR INTERP: SIGNIFICANT CHANGE UP
RIFAMPIN PCR INTERP: SIGNIFICANT CHANGE UP
S PNEUM AG UR QL: NEGATIVE — SIGNIFICANT CHANGE UP
SODIUM SERPL-SCNC: 135 MMOL/L — SIGNIFICANT CHANGE UP (ref 135–145)
SPECIMEN SOURCE: SIGNIFICANT CHANGE UP
WBC # BLD: 5.46 K/UL — SIGNIFICANT CHANGE UP (ref 3.8–10.5)
WBC # FLD AUTO: 5.46 K/UL — SIGNIFICANT CHANGE UP (ref 3.8–10.5)

## 2021-11-16 PROCEDURE — 99232 SBSQ HOSP IP/OBS MODERATE 35: CPT | Mod: GC

## 2021-11-16 PROCEDURE — 99233 SBSQ HOSP IP/OBS HIGH 50: CPT | Mod: GC

## 2021-11-16 RX ORDER — HEXAVITAMINS
300 TABLET ORAL EVERY 24 HOURS
Refills: 0 | Status: DISCONTINUED | OUTPATIENT
Start: 2021-11-16 | End: 2021-11-18

## 2021-11-16 RX ORDER — PYRAZINAMIDE 500 MG/1
1000 TABLET ORAL EVERY 24 HOURS
Refills: 0 | Status: DISCONTINUED | OUTPATIENT
Start: 2021-11-16 | End: 2021-11-18

## 2021-11-16 RX ORDER — PYRIDOXINE HCL (VITAMIN B6) 100 MG
50 TABLET ORAL EVERY 24 HOURS
Refills: 0 | Status: DISCONTINUED | OUTPATIENT
Start: 2021-11-16 | End: 2021-11-18

## 2021-11-16 RX ORDER — ETHAMBUTOL HYDROCHLORIDE 400 MG/1
800 TABLET, FILM COATED ORAL EVERY 24 HOURS
Refills: 0 | Status: DISCONTINUED | OUTPATIENT
Start: 2021-11-16 | End: 2021-11-18

## 2021-11-16 RX ADMIN — Medication 50 MILLIGRAM(S): at 15:05

## 2021-11-16 RX ADMIN — Medication 325 MILLIGRAM(S): at 11:06

## 2021-11-16 RX ADMIN — ETHAMBUTOL HYDROCHLORIDE 800 MILLIGRAM(S): 400 TABLET, FILM COATED ORAL at 15:06

## 2021-11-16 RX ADMIN — PYRAZINAMIDE 1000 MILLIGRAM(S): 500 TABLET ORAL at 15:05

## 2021-11-16 RX ADMIN — Medication 300 MILLIGRAM(S): at 15:05

## 2021-11-16 NOTE — PROGRESS NOTE ADULT - PROBLEM SELECTOR PLAN 1
Pt presenting with cough x5 days with also associated fevers as high as 102 x2 weeks. No hemoptysis, weight loss or recent travel or hx of TB. [place] 3 years ago. Fount to have a moderate right sided effusion w/ loculations on CT scan and ultrasound, concerning for RALPH vs. TB vs malignancy/autoimmune disease     - no antibiotics currently recommended  - s/p chest tube placement w/ drainage of serous fluid.   - Pleural fluid analysis consistent w/ exudative effusion   - 1/3 AFB smears negative, procalcitonin elevated to 0.18  - f/u fungitell, aspergillus, sputum cx  - Pulm following - consider pleural biopsy and TPA/Dornase for further management Pt presenting with cough x5 days with also associated fevers as high as 102 x2 weeks. No hemoptysis, weight loss or recent travel or hx of TB. [place]oved to [place] 3 years ago. Found to have a moderate right sided effusion w/ loculations on CT scan and ultrasound, concerning for RALPH vs. TB vs malignancy/autoimmune disease     - no antibiotics currently recommended until confirmation of TB due to possibility of resistance   - s/p chest tube placement w/ drainage of serous fluid. d/c on 11/15  - Pleural fluid analysis consistent w/ exudative effusion   - 3/3 AFB smears negative, procalcitonin elevated to 0.18  - Streptococcus pneumoniae Ag urine negative   - Fungitell negative  - f/u Aspergillus Galactomannan Ag, ADA, cytology, HIV Ag/Ab testing   - Infectious Disease following - requested TB PCR of pleural fluid and AFB sputum culture  - Pulm following - consider pleural biopsy via VATS pending TB PCR of pleural fluid and AFB sputum culture results

## 2021-11-16 NOTE — PROGRESS NOTE ADULT - SUBJECTIVE AND OBJECTIVE BOX
infectious diseases progress note:  DILLAN RANGEL is a 21yMale patient    ABNORMAL XRAY      Cough    Pleural effusion    Nutrition, metabolism, and development symptoms    Anemia    Microcytic anemia        ROS:  CONSTITUTIONAL:  Negative fever or chills, feels well, good appetite  EYES:  Negative  blurry vision or double vision  CARDIOVASCULAR:  Negative for chest pain or palpitations  RESPIRATORY:  Negative for cough, wheezing, or SOB   GASTROINTESTINAL:  Negative for nausea, vomiting, diarrhea, constipation, or abdominal pain  GENITOURINARY:  Negative frequency, urgency or dysuria  NEUROLOGIC:  No headache, confusion, dizziness, lightheadedness    Allergies    No Known Allergies    Intolerances        ANTIBIOTICS/RELEVANT:  antimicrobials    immunologic:  influenza   Vaccine 0.5 milliLiter(s) IntraMuscular once    OTHER:  acetaminophen     Tablet .. 650 milliGRAM(s) Oral every 6 hours PRN  ferrous    sulfate 325 milliGRAM(s) Oral daily  ondansetron Injectable 4 milliGRAM(s) IV Push every 6 hours PRN      Objective:  Vital Signs Last 24 Hrs  T(C): 36.9 (16 Nov 2021 06:30), Max: 37.7 (15 Nov 2021 20:55)  T(F): 98.4 (16 Nov 2021 06:30), Max: 99.8 (15 Nov 2021 20:55)  HR: 98 (16 Nov 2021 06:30) (98 - 109)  BP: 94/61 (16 Nov 2021 06:30) (94/61 - 95/61)  BP(mean): --  RR: 20 (16 Nov 2021 06:30) (16 - 20)  SpO2: 98% (16 Nov 2021 06:30) (97% - 99%)    PHYSICAL EXAM:  Constitutional:Well-developed, well nourishe  Eyes:DEANN, EOMI  Ear/Nose/Throat: no oral lesion, no sinus tenderness on percussion	  Neck:no JVD, no lymphadenopathy, supple  Respiratory: CTA naveed  Cardiovascular: S1S2 RRR, no murmurs  Gastrointestinal:soft, (+) BS, no HSM  Extremities:no e/e/c        LABS:                        11.9   5.46  )-----------( 399      ( 16 Nov 2021 07:24 )             38.4     11-16    135  |  99  |  x   ----------------------------<  94  4.0   |  24  |  0.75    Ca    9.2      16 Nov 2021 07:24  Phos  4.4     11-16  Mg     2.0     11-16              MICROBIOLOGY:  Culture Results:   Sputum specimen rejected.  Microscopic examination indicates  oropharyngeal contamination.  Please repeat. (11-15 @ 11:28)        RADIOLOGY & ADDITIONAL STUDIES: Infectious diseases progress note:    DILLAN RANGEL is a 21yMale patient    ABNORMAL XRAY    OVERNIGHT/ INTERVAL HPI:     No acute overnight events. Patient seen and examined at beside. He reports improvement in symptoms, feels overall well at this time. Saturating well on room air, cough improved.           Cough    Pleural effusion    Nutrition, metabolism, and development symptoms    Anemia    Microcytic anemia        ROS:  CONSTITUTIONAL:  Negative fever or chills, feels well, good appetite  EYES:  Negative  blurry vision or double vision  CARDIOVASCULAR:  Negative for chest pain or palpitations  RESPIRATORY:  Negative for cough, wheezing, or SOB   GASTROINTESTINAL:  Negative for nausea, vomiting, diarrhea, constipation, or abdominal pain  GENITOURINARY:  Negative frequency, urgency or dysuria  NEUROLOGIC:  No headache, confusion, dizziness, lightheadedness    Allergies    No Known Allergies    Intolerances        ANTIBIOTICS/RELEVANT:  antimicrobials    immunologic:  influenza   Vaccine 0.5 milliLiter(s) IntraMuscular once    OTHER:  acetaminophen     Tablet .. 650 milliGRAM(s) Oral every 6 hours PRN  ferrous    sulfate 325 milliGRAM(s) Oral daily  ondansetron Injectable 4 milliGRAM(s) IV Push every 6 hours PRN      Objective:  Vital Signs Last 24 Hrs  T(C): 36.9 (16 Nov 2021 06:30), Max: 37.7 (15 Nov 2021 20:55)  T(F): 98.4 (16 Nov 2021 06:30), Max: 99.8 (15 Nov 2021 20:55)  HR: 98 (16 Nov 2021 06:30) (98 - 109)  BP: 94/61 (16 Nov 2021 06:30) (94/61 - 95/61)  BP(mean): --  RR: 20 (16 Nov 2021 06:30) (16 - 20)  SpO2: 98% (16 Nov 2021 06:30) (97% - 99%)    PHYSICAL EXAM:  Constitutional:Well-developed, well nourishe  Eyes:DEANN, EOMI  Ear/Nose/Throat: no oral lesion, no sinus tenderness on percussion	  Neck:no JVD, no lymphadenopathy, supple  Respiratory: CTA naveed  Cardiovascular: S1S2 RRR, no murmurs  Gastrointestinal:soft, (+) BS, no HSM  Extremities:no e/e/c        LABS:                        11.9   5.46  )-----------( 399      ( 16 Nov 2021 07:24 )             38.4     11-16    135  |  99  |  x   ----------------------------<  94  4.0   |  24  |  0.75    Ca    9.2      16 Nov 2021 07:24  Phos  4.4     11-16  Mg     2.0     11-16              MICROBIOLOGY:  Culture Results:   Sputum specimen rejected.  Microscopic examination indicates  oropharyngeal contamination.  Please repeat. (11-15 @ 11:28)        RADIOLOGY & ADDITIONAL STUDIES:

## 2021-11-16 NOTE — PROGRESS NOTE ADULT - ASSESSMENT
Pt is a 22 y/o M w/ no pmhx presenting with complaints of cough x5 days, found to have right sided pleural effusion. Pt admitted for further eval/management of pleural effusion and r/o TB.  Pt is a 20 y/o M w/ no pmhx presenting with complaints of cough x5 days, found to have right sided pleural effusion. Pt admitted for further eval/management of pleural effusion and r/o TB, malignant lymphoma, and fungal etiology.

## 2021-11-16 NOTE — PROGRESS NOTE ADULT - ATTENDING COMMENTS
Patient was seen and examined with the resident team today.  I agree with Dr. Zimmerman's assessment and plan with the following exceptions/additions:     Briefly, this is a 20yo gentleman with no PMH, native of [place] w/extensive international travel, who p/w night sweats and productive cough associated with dyspnea, found to have a R-sided pleural effusion and tree-and-bud lesions on chest CT c/f TB.  He had a [facility] QuantiFeron on 11/11 that resulted positive on 11/15.  AFB's NGTD; however, given high clinical suspicion, awaiting PCR testing for cultures and pleural fluid.     #TB R/O - f/u ADA, PCR testing and c/w airborne isolation for now; may or may not need pleural biopsy pending PCR   #R-sided, exudative pleural effusion - s/p chest tubet; Pulm following, appreciate assistance  #ZI - no signs or symptoms of acute or chronic blood loss; c/w PO iron  #DVT PPx - ambulatory  #Dispo - TBD     Minda Bernal  [phone]

## 2021-11-16 NOTE — PROGRESS NOTE ADULT - ASSESSMENT
Patient is a 22 y/o M, never smoker, w/ no PMHx presenting with complaints of cough, SOB for 5 days and fevers with night sweats for 2 weeks. CT with moderate sized pleural effusion, RUL cystic lesion and atelectatic changes to medial segment of RML. Pulmonology consulted for pleural effusion and concern for TB.     #Loculated Pleural Effusion  #Tree-in-Bud nodules  #Atelectasis  #RUL Cystic Lesion    Patient with fevers, chills, night sweats, cough, SOB for 5 days with moderate pleural effusion, cystic lesion and atelectasis on CT. Differentials include TB as patient is from endemic area, malignancy such as lymphoma, MAC vs fungal infection though this is less likely given lack of known immunodeficiency and no travel to endemic area of fungal infection. Now s/p chest tube insertion with 1300 cc cloudy serous fluid. Bedside US show small pleural fluid remaining with sediment. S/p removal of chest tube, pleural fluid is exudative by light's criteria. 3x sputum AFB smears are negative.    Recommend the following:    -will f/u pleural fluid studies including ADA and cytology  -discussed patient with ID, we will run TB PCR on pleural fluid  -will discuss further need for biopsy via VATs depending on these test results    Pulmonary will follow, continue excellent care per primary team. Patient is a 20 y/o M, never smoker, w/ no PMHx presenting with complaints of cough, SOB for 5 days and fevers with night sweats for 2 weeks. CT with moderate sized pleural effusion, RUL cystic lesion and atelectatic changes to medial segment of RML. Pulmonology consulted for pleural effusion and concern for TB.     #Loculated Pleural Effusion  #Tree-in-Bud nodules  #Atelectasis  #RUL Cystic Lesion    Patient with fevers, chills, night sweats, cough, SOB for 5 days with moderate pleural effusion, cystic lesion and atelectasis on CT. Differentials include TB as patient is from endemic area, malignancy such as lymphoma, MAC vs fungal infection though this is less likely given lack of known immunodeficiency and no travel to endemic area of fungal infection. Now s/p chest tube insertion with 1300 cc cloudy serous fluid. Bedside US show small pleural fluid remaining with sediment. S/p removal of chest tube, pleural fluid is exudative by light's criteria. 3x sputum AFB smears are negative.    Recommend the following:    -TB PCR positive from sputum and negative for Rifampin resistance  -agree with ID, will start RIPE and follow up culture results for further sensitivity testing  -patient capable of isolating at home if necessary     Patient should follow up with pulmonology for monitoring for resolution of right sided pleural effusion. If patient would like to follow up at [facility], please make an appointment to have him follow with Dr. Vega in 4 weeks. He can otherwise follow up with a pulmonologist who works with his PCP.  Patient is a 22 y/o M, never smoker, w/ no PMHx presenting with complaints of cough, SOB for 5 days and fevers with night sweats for 2 weeks. CT with moderate sized pleural effusion, RUL cystic lesion and atelectatic changes to medial segment of RML. Pulmonology consulted for pleural effusion and concern for TB.     #Loculated Pleural Effusion  #Tree-in-Bud nodules  #Atelectasis  #RUL Cystic Lesion    Patient with fevers, chills, night sweats, cough, SOB for 5 days with moderate pleural effusion, cystic lesion and atelectasis on CT. Differentials include TB as patient is from endemic area, malignancy such as lymphoma, MAC vs fungal infection though this is less likely given lack of known immunodeficiency and no travel to endemic area of fungal infection. Now s/p chest tube insertion with 1300 cc cloudy serous fluid. Bedside US show small pleural fluid remaining with sediment. S/p removal of chest tube, pleural fluid is exudative by light's criteria.     Recommend the following:    -TB PCR positive from sputum and negative for Rifampin resistance  -agree with ID, will start RIPE and follow up culture results for further sensitivity testing  -patient capable of isolating at home if necessary     Patient should follow up with pulmonology for monitoring for resolution of right sided pleural effusion. If patient would like to follow up at [facility], please make an appointment to have him follow with Dr. Vega in 4 weeks. He can otherwise follow up with a pulmonologist who works with his PCP.

## 2021-11-16 NOTE — PROGRESS NOTE ADULT - PROBLEM SELECTOR PLAN 3
F: none  E: replete prn  N: nutrition  DVT: None - improve score 0  Dispo: [facility]F F: none  E: replete prn  N: regular diet  DVT: None - improve score 0  Dispo: RMF F: none  E: replete prn  N: regular diet  DVT: None - improve score 0  Dispo: home

## 2021-11-16 NOTE — PROGRESS NOTE ADULT - ASSESSMENT
21M from [facility] with no PMHx, p/w 2 week h/o fever and night sweats and 5-day history of productive cough, found to have right sided loculated pleural effusion, tree-in-bud nodules, and brochiectasis on CT scan, as well as positive quantiferon-TB gold test. Now s/p chest tube drainage of 1.3L serous cloudy fluid. Patient has lived in [place] and [place] [facility] in the past as well. Denies hemoptysis, weight loss, or history of TB. Patient did receive the BCG vaccine. Patient reports he had never had a PPD or QFT test before. He says he did have a X-ray to rule out TB a year ago. Sputum AFB smears have been negative x3. Disease presentation is concerning for active TB vs. latent TB vs. fungal infection vs. lymphoma vs. RALPH. AFB cultures are pending, likely will take 4-6 weeks to result. Cannot start latent TB treatment at this time because high suspicion for active TB, and latent TB treatment could risk development of drug-resistant TB.    Recommend:  1. Send HIV Ag/Ab test  2. Send Strep urine Ag test  3. No antibiotics at this time  4. F/u AFB culture  5. F/u Fungitell, Aspergillus Galactomannan Ag, ADA    ID Team 1 following 21M from [place] with frequent travel, no PMHx, p/w 2 week h/o fever, night sweats, and productive cough, found to have right sided loculated pleural effusion, tree-in-bud nodules, and bronchiectasis on CT scan, as well as positive QuantiFeron gold test. Now s/p chest tube drainage of 1.3L serous cloudy fluid. Sputum AFB smears have been negative x3. Disease presentation is concerning for active TB. TB positive in sputum by PCR on 11/14. Pleural fluid sample, TB PCR not detected. AFB cultures are pending, likely will take 4-6 weeks to result. Will start RIPE treatment for active TB and follow up TB cultures.       #Active Pulmonary Tuberculosis     Recommend:  1. START treatment for active TB with RIPE: (weight based)          -Rifampin  600mg qd          -Isoniazid 300mg qd          -pyrazinamide 1000mg qd          -ethambutol 800mg qd  2. Send HIV Ag/Ab test  3. Requires eye exam while on treatment with ethambutol  4. Monitor LFTs while on treatment   5. F/u AFB culture  6. F/u Fungitell, Aspergillus Galactomannan Ag, ADA  7. Monitor inpatient while on initiation of treatment     Will need to contact department of health regarding need for isolation duration in setting of negative AFB sputum/ initiation of treatment.     ID Team 1 following    Plan discussed with ID attending.  21M from [place] with frequent travel, no PMHx, p/w 2 week h/o fever, night sweats, and productive cough, found to have right sided loculated pleural effusion, tree-in-bud nodules, and bronchiectasis on CT scan, as well as positive QuantiFeron gold test. Now s/p chest tube drainage of 1.3L serous cloudy fluid. Sputum AFB smears have been negative x3. Disease presentation is concerning for active TB. TB positive in sputum by PCR on 11/14. Pleural fluid sample, TB PCR not detected. AFB cultures are pending, likely will take 4-6 weeks to result. Will start RIPE treatment for active TB and follow up TB cultures.       #Active Pulmonary Tuberculosis     Recommend:  1. START treatment for active TB with RIPE: (weight based)          -Rifampin  600mg qd          -Isoniazid 300mg qd          -pyrazinamide 1000mg qd          -ethambutol 800mg qd          Also start Pyridoxine (Vitamin B6) 50 mg PO daily  2. Send HIV Ag/Ab test  3. Requires eye exam while on treatment with ethambutol  4. Monitor LFTs while on treatment   5. F/u AFB culture  6. F/u Fungitell, Aspergillus Galactomannan Ag, ADA  7. Monitor inpatient while on initiation of treatment     Will need to contact department of health regarding need for isolation duration in setting of negative AFB sputum/ initiation of treatment.     ID Team 1 following    Plan discussed with ID attending.

## 2021-11-16 NOTE — PROGRESS NOTE ADULT - PROBLEM SELECTOR PLAN 2
- Hb 10.8 w/ MCV 75.8. No s/s of bleeding.   - iron studies consistent with iron deficiency anemia   - ferrous sulfate 325mg daily - Hb 11.9 w/ MCV 73.6. No s/s of bleeding.   - iron studies consistent with iron deficiency anemia   - ferrous sulfate 325mg daily  - encouraged to increase dietary intake of green leafy vegetables and fruits

## 2021-11-16 NOTE — PROGRESS NOTE ADULT - ATTENDING COMMENTS
Agree with above.  MTB PCR on sputum sample is positive.  This result + overall clinical picture suggests Pleural TB is the diagnosis here.  Can start RIPE + vitamin B6. Thankfully no Rifampin resistance has been detected.  Will need to discuss with [facility] re:  follow up and when patient can attend classes again.  I would favor he isolate at home until on active meds x 2 weeks.

## 2021-11-16 NOTE — PROGRESS NOTE ADULT - SUBJECTIVE AND OBJECTIVE BOX
OVERNIGHT EVENTS:    SUBJECTIVE / INTERVAL HPI: Patient seen and examined at bedside.     VITAL SIGNS:  Vital Signs Last 24 Hrs  T(C): 36.9 (16 Nov 2021 06:30), Max: 37.7 (15 Nov 2021 20:55)  T(F): 98.4 (16 Nov 2021 06:30), Max: 99.8 (15 Nov 2021 20:55)  HR: 98 (16 Nov 2021 06:30) (96 - 109)  BP: 94/61 (16 Nov 2021 06:30) (89/62 - 95/61)  BP(mean): --  RR: 20 (16 Nov 2021 06:30) (16 - 20)  SpO2: 98% (16 Nov 2021 06:30) (96% - 99%)  I&O's Summary      PHYSICAL EXAM:    General: NAD, lying comfortably in bed   HEENT: Anicteric sclera, EOMI, MMM  Cardiovascular: +S1/S2; RRR; No JVD   Respiratory: CTAB, no accessory muscle use  Gastrointestinal: soft, non-distended, no tenderness to palpation  Extremities: WWP; no edema, no erythema, no tenderness to palpation  Vascular: 2+ radial, DP/PT pulses B/L  Neurological: AAOx3; no focal deficits  Skin: No visible rash, skin discoloration    MEDICATIONS:  MEDICATIONS  (STANDING):  ferrous    sulfate 325 milliGRAM(s) Oral daily  influenza   Vaccine 0.5 milliLiter(s) IntraMuscular once    MEDICATIONS  (PRN):  acetaminophen     Tablet .. 650 milliGRAM(s) Oral every 6 hours PRN Temp greater or equal to 38C (100.4F), Mild Pain (1 - 3)  ondansetron Injectable 4 milliGRAM(s) IV Push every 6 hours PRN Nausea and/or Vomiting      ALLERGIES:  Allergies    No Known Allergies    Intolerances        LABS:              CAPILLARY BLOOD GLUCOSE          RADIOLOGY & ADDITIONAL TESTS: Reviewed.   OVERNIGHT EVENTS: Chest tube removal yesterday afternoon. Otherwise no overnight events.     SUBJECTIVE / INTERVAL HPI: Patient seen and examined at bedside. Patient endorses subjective fever at bedtime. He otherwise reports feeling comfortable and sleeping well overnight. Reports feelings of anxiety about his treatment plan as he wishes to return to school as quickly as possible. Denies cough, chills, SOB, night sweats, or pain at chest tube site.    VITAL SIGNS:  Vital Signs Last 24 Hrs  T(C): 36.9 (16 Nov 2021 06:30), Max: 37.7 (15 Nov 2021 20:55)  T(F): 98.4 (16 Nov 2021 06:30), Max: 99.8 (15 Nov 2021 20:55)  HR: 98 (16 Nov 2021 06:30) (96 - 109)  BP: 94/61 (16 Nov 2021 06:30) (89/62 - 95/61)  BP(mean): --  RR: 20 (16 Nov 2021 06:30) (16 - 20)  SpO2: 98% (16 Nov 2021 06:30) (96% - 99%)  I&O's Summary      PHYSICAL EXAM:    General: NAD, lying comfortably in bed   HEENT: Anicteric sclera, EOMI, MMM  Cardiovascular: +S1/S2; RRR; No JVD   Respiratory: Mild crackles noted on right lower lung base. No accessory muscle use  Gastrointestinal: soft, non-distended, no tenderness to palpation  Extremities: WWP; no edema, no erythema, no tenderness to palpation  Vascular: 2+ radial, DP/PT pulses B/L  Neurological: AAOx3; no focal deficits  Skin: No visible rash, skin discoloration    MEDICATIONS:  MEDICATIONS  (STANDING):  ferrous    sulfate 325 milliGRAM(s) Oral daily  influenza   Vaccine 0.5 milliLiter(s) IntraMuscular once    MEDICATIONS  (PRN):  acetaminophen     Tablet .. 650 milliGRAM(s) Oral every 6 hours PRN Temp greater or equal to 38C (100.4F), Mild Pain (1 - 3)  ondansetron Injectable 4 milliGRAM(s) IV Push every 6 hours PRN Nausea and/or Vomiting      ALLERGIES:  Allergies    No Known Allergies    Intolerances          LABS:                         11.9   5.46  )-----------( 399      ( 16 Nov 2021 07:24 )             38.4     11-16    135  |  99  |  18  ----------------------------<  94  4.0   |  24  |  0.75    Ca    9.2      16 Nov 2021 07:24  Phos  4.4     11-16  Mg     2.0     11-16            RADIOLOGY, EKG & ADDITIONAL TESTS:           CAPILLARY BLOOD GLUCOSE          RADIOLOGY & ADDITIONAL TESTS: Reviewed.

## 2021-11-16 NOTE — PROGRESS NOTE ADULT - SUBJECTIVE AND OBJECTIVE BOX
PULMONARY CONSULT SERVICE FOLLOW-UP NOTE    INTERVAL HPI:  Reviewed chart and overnight events; patient seen and examined at bedside. Patient states he is feeling well overall. No significant pain at chest tube site.     MEDICATIONS:  Pulmonary:    Antimicrobials:    Anticoagulants:    Cardiac:    Allergies    No Known Allergies    Intolerances    Vital Signs Last 24 Hrs  T(C): 36.9 (16 Nov 2021 06:30), Max: 37.7 (15 Nov 2021 20:55)  T(F): 98.4 (16 Nov 2021 06:30), Max: 99.8 (15 Nov 2021 20:55)  HR: 98 (16 Nov 2021 06:30) (98 - 109)  BP: 94/61 (16 Nov 2021 06:30) (94/61 - 95/61)  BP(mean): --  RR: 20 (16 Nov 2021 06:30) (16 - 20)  SpO2: 98% (16 Nov 2021 06:30) (97% - 99%)    PHYSICAL EXAM:  Constitutional: WDWN  HEENT: NC/AT; PERRL, anicteric sclera; MMM  Neck: supple  Cardiovascular: +S1/S2, RRR  Respiratory: CTA B/L; no W/R/R  Gastrointestinal: soft, NT/ND  Extremities: WWP; no edema, clubbing or cyanosis  Vascular: 2+ radial pulses B/L  Neurological: awake and alert; RODRIGUEZ    LABS:  CBC Full  -  ( 16 Nov 2021 07:24 )  WBC Count : 5.46 K/uL  RBC Count : 5.22 M/uL  Hemoglobin : 11.9 g/dL  Hematocrit : 38.4 %  Platelet Count - Automated : 399 K/uL  Mean Cell Volume : 73.6 fl  Mean Cell Hemoglobin : 22.8 pg  Mean Cell Hemoglobin Concentration : 31.0 gm/dL  Auto Neutrophil # : x  Auto Lymphocyte # : x  Auto Monocyte # : x  Auto Eosinophil # : x  Auto Basophil # : x  Auto Neutrophil % : x  Auto Lymphocyte % : x  Auto Monocyte % : x  Auto Eosinophil % : x  Auto Basophil % : x    11-16    135  |  99  |  18  ----------------------------<  94  4.0   |  24  |  0.75    Ca    9.2      16 Nov 2021 07:24  Phos  4.4     11-16  Mg     2.0     11-16    RADIOLOGY & ADDITIONAL STUDIES:

## 2021-11-17 ENCOUNTER — TRANSCRIPTION ENCOUNTER (OUTPATIENT)
Age: 21
End: 2021-11-17

## 2021-11-17 DIAGNOSIS — A15.0 TUBERCULOSIS OF LUNG: ICD-10-CM

## 2021-11-17 PROBLEM — Z78.9 OTHER SPECIFIED HEALTH STATUS: Chronic | Status: ACTIVE | Noted: 2021-11-12

## 2021-11-17 LAB
A FLAVUS AB FLD QL: NEGATIVE — SIGNIFICANT CHANGE UP
A NIGER AB FLD QL: NEGATIVE — SIGNIFICANT CHANGE UP
A NIGER AB FLD QL: NEGATIVE — SIGNIFICANT CHANGE UP
ADENOSINE DEAMINASE PLR-CCNC: 55 U/L — HIGH (ref 0–30)
ALBUMIN SERPL ELPH-MCNC: 3.2 G/DL — LOW (ref 3.3–5)
ALP SERPL-CCNC: 83 U/L — SIGNIFICANT CHANGE UP (ref 40–120)
ALT FLD-CCNC: 8 U/L — LOW (ref 10–45)
ANION GAP SERPL CALC-SCNC: 10 MMOL/L — SIGNIFICANT CHANGE UP (ref 5–17)
ANISOCYTOSIS BLD QL: SLIGHT — SIGNIFICANT CHANGE UP
AST SERPL-CCNC: 14 U/L — SIGNIFICANT CHANGE UP (ref 10–40)
BASOPHILS # BLD AUTO: 0 K/UL — SIGNIFICANT CHANGE UP (ref 0–0.2)
BASOPHILS NFR BLD AUTO: 0 % — SIGNIFICANT CHANGE UP (ref 0–2)
BILIRUB SERPL-MCNC: 0.5 MG/DL — SIGNIFICANT CHANGE UP (ref 0.2–1.2)
BUN SERPL-MCNC: 17 MG/DL — SIGNIFICANT CHANGE UP (ref 7–23)
CALCIUM SERPL-MCNC: 9.1 MG/DL — SIGNIFICANT CHANGE UP (ref 8.4–10.5)
CHLORIDE SERPL-SCNC: 100 MMOL/L — SIGNIFICANT CHANGE UP (ref 96–108)
CO2 SERPL-SCNC: 26 MMOL/L — SIGNIFICANT CHANGE UP (ref 22–31)
CREAT SERPL-MCNC: 0.86 MG/DL — SIGNIFICANT CHANGE UP (ref 0.5–1.3)
CULTURE RESULTS: SIGNIFICANT CHANGE UP
CULTURE RESULTS: SIGNIFICANT CHANGE UP
EOSINOPHIL # BLD AUTO: 0.04 K/UL — SIGNIFICANT CHANGE UP (ref 0–0.5)
EOSINOPHIL NFR BLD AUTO: 0.9 % — SIGNIFICANT CHANGE UP (ref 0–6)
GALACTOMANNAN AG SERPL-ACNC: 0.05 INDEX — SIGNIFICANT CHANGE UP (ref 0–0.49)
GAMMA INTERFERON BACKGROUND BLD IA-ACNC: 0.3 IU/ML — SIGNIFICANT CHANGE UP
GIANT PLATELETS BLD QL SMEAR: PRESENT — SIGNIFICANT CHANGE UP
GLUCOSE SERPL-MCNC: 91 MG/DL — SIGNIFICANT CHANGE UP (ref 70–99)
HAV IGM SER-ACNC: SIGNIFICANT CHANGE UP
HBV CORE IGM SER-ACNC: SIGNIFICANT CHANGE UP
HBV SURFACE AG SER-ACNC: SIGNIFICANT CHANGE UP
HCT VFR BLD CALC: 37 % — LOW (ref 39–50)
HCV AB S/CO SERPL IA: 0.7 S/CO — SIGNIFICANT CHANGE UP
HCV AB SERPL-IMP: SIGNIFICANT CHANGE UP
HGB BLD-MCNC: 11.4 G/DL — LOW (ref 13–17)
HIV 1+2 AB+HIV1 P24 AG SERPL QL IA: SIGNIFICANT CHANGE UP
HYPOCHROMIA BLD QL: SIGNIFICANT CHANGE UP
LYMPHOCYTES # BLD AUTO: 1.19 K/UL — SIGNIFICANT CHANGE UP (ref 1–3.3)
LYMPHOCYTES # BLD AUTO: 26.3 % — SIGNIFICANT CHANGE UP (ref 13–44)
M TB IFN-G BLD-IMP: POSITIVE
M TB IFN-G CD4+ BCKGRND COR BLD-ACNC: 5.81 IU/ML — SIGNIFICANT CHANGE UP
M TB IFN-G CD4+CD8+ BCKGRND COR BLD-ACNC: 6.18 IU/ML — SIGNIFICANT CHANGE UP
MAGNESIUM SERPL-MCNC: 2.2 MG/DL — SIGNIFICANT CHANGE UP (ref 1.6–2.6)
MANUAL SMEAR VERIFICATION: SIGNIFICANT CHANGE UP
MCHC RBC-ENTMCNC: 23.5 PG — LOW (ref 27–34)
MCHC RBC-ENTMCNC: 30.8 GM/DL — LOW (ref 32–36)
MCV RBC AUTO: 76.3 FL — LOW (ref 80–100)
MONOCYTES # BLD AUTO: 0.56 K/UL — SIGNIFICANT CHANGE UP (ref 0–0.9)
MONOCYTES NFR BLD AUTO: 12.3 % — SIGNIFICANT CHANGE UP (ref 2–14)
NEUTROPHILS # BLD AUTO: 2.39 K/UL — SIGNIFICANT CHANGE UP (ref 1.8–7.4)
NEUTROPHILS NFR BLD AUTO: 50.9 % — SIGNIFICANT CHANGE UP (ref 43–77)
NEUTS BAND # BLD: 1.7 % — SIGNIFICANT CHANGE UP (ref 0–8)
OVALOCYTES BLD QL SMEAR: SLIGHT — SIGNIFICANT CHANGE UP
PHOSPHATE SERPL-MCNC: 5 MG/DL — HIGH (ref 2.5–4.5)
PLAT MORPH BLD: ABNORMAL
PLATELET # BLD AUTO: 368 K/UL — SIGNIFICANT CHANGE UP (ref 150–400)
POIKILOCYTOSIS BLD QL AUTO: SLIGHT — SIGNIFICANT CHANGE UP
POLYCHROMASIA BLD QL SMEAR: SLIGHT — SIGNIFICANT CHANGE UP
POTASSIUM SERPL-MCNC: 4.4 MMOL/L — SIGNIFICANT CHANGE UP (ref 3.5–5.3)
POTASSIUM SERPL-SCNC: 4.4 MMOL/L — SIGNIFICANT CHANGE UP (ref 3.5–5.3)
PROT SERPL-MCNC: 7.3 G/DL — SIGNIFICANT CHANGE UP (ref 6–8.3)
QUANT TB PLUS MITOGEN MINUS NIL: 6.88 IU/ML — SIGNIFICANT CHANGE UP
RBC # BLD: 4.85 M/UL — SIGNIFICANT CHANGE UP (ref 4.2–5.8)
RBC # FLD: 15.6 % — HIGH (ref 10.3–14.5)
RBC BLD AUTO: ABNORMAL
SODIUM SERPL-SCNC: 136 MMOL/L — SIGNIFICANT CHANGE UP (ref 135–145)
SPECIMEN SOURCE: SIGNIFICANT CHANGE UP
SPECIMEN SOURCE: SIGNIFICANT CHANGE UP
VARIANT LYMPHS # BLD: 7.9 % — HIGH (ref 0–6)
WBC # BLD: 4.54 K/UL — SIGNIFICANT CHANGE UP (ref 3.8–10.5)
WBC # FLD AUTO: 4.54 K/UL — SIGNIFICANT CHANGE UP (ref 3.8–10.5)

## 2021-11-17 PROCEDURE — 99232 SBSQ HOSP IP/OBS MODERATE 35: CPT | Mod: GC

## 2021-11-17 RX ORDER — ETHAMBUTOL HYDROCHLORIDE 400 MG/1
2 TABLET, FILM COATED ORAL
Qty: 0 | Refills: 0 | DISCHARGE
Start: 2021-11-17

## 2021-11-17 RX ORDER — PYRAZINAMIDE 500 MG/1
2 TABLET ORAL
Qty: 0 | Refills: 0 | DISCHARGE
Start: 2021-11-17

## 2021-11-17 RX ORDER — HEXAVITAMINS
1 TABLET ORAL
Qty: 0 | Refills: 0 | DISCHARGE
Start: 2021-11-17

## 2021-11-17 RX ORDER — PYRIDOXINE HCL (VITAMIN B6) 100 MG
1 TABLET ORAL
Qty: 0 | Refills: 0 | DISCHARGE
Start: 2021-11-17

## 2021-11-17 RX ADMIN — Medication 300 MILLIGRAM(S): at 13:47

## 2021-11-17 RX ADMIN — ETHAMBUTOL HYDROCHLORIDE 800 MILLIGRAM(S): 400 TABLET, FILM COATED ORAL at 13:48

## 2021-11-17 RX ADMIN — Medication 325 MILLIGRAM(S): at 11:51

## 2021-11-17 RX ADMIN — PYRAZINAMIDE 1000 MILLIGRAM(S): 500 TABLET ORAL at 14:34

## 2021-11-17 RX ADMIN — Medication 50 MILLIGRAM(S): at 13:47

## 2021-11-17 NOTE — DISCHARGE NOTE PROVIDER - NSDCCPCAREPLAN_GEN_ALL_CORE_FT
PRINCIPAL DISCHARGE DIAGNOSIS  Diagnosis: Pulmonary tuberculosis  Assessment and Plan of Treatment:       SECONDARY DISCHARGE DIAGNOSES  Diagnosis: Iron deficiency anemia  Assessment and Plan of Treatment:      PRINCIPAL DISCHARGE DIAGNOSIS  Diagnosis: Pulmonary tuberculosis  Assessment and Plan of Treatment: Refer to document      SECONDARY DISCHARGE DIAGNOSES  Diagnosis: Iron deficiency anemia  Assessment and Plan of Treatment: Refer to document     PRINCIPAL DISCHARGE DIAGNOSIS  Diagnosis: Pulmonary tuberculosis  Assessment and Plan of Treatment: You were diagnosed with tuberculosis in the your lung. We made this diagnosis using your quantiferon gold and a PCR sample. We started you several antibiotics, known as RIPE therapy.   You will need to take Rifampine, Isoniazid, Pyrazinamide, Ethambutol once daily for several months and follow-up at a TB clinic. You also need to follow up with the department of health for further guidance on isolation and contact tracing.   You will need to self isolate for at least 2 weeks after starting the antibiotic therapy on 11/16.      SECONDARY DISCHARGE DIAGNOSES  Diagnosis: Pleural effusion  Assessment and Plan of Treatment: A pleural effusion occurs when fluid collects in the space around the lung. You had a collection around your right lung due to your TB infection.   We drained the fluid by placing a tube in your chest, and the effusion improved.   Please return the hospital if you develop worsening shortness of breath or difficulty breathing.    Diagnosis: Iron deficiency anemia  Assessment and Plan of Treatment: You were diagnosed with anemia, or low counts of your red blood cells. Your low counts are due to iron deficiency, which can be a result of your diet or your TB infection.   You were given iron supplementation during your hospitalization.   Please continue to follow up with your primary care physician for routine monitoring of your blood count adn iron levels. If your levels remain low, you can take over the counter iron supplements. A common side effect of iron supplementation is constipation. If you develop this symptom, you can also take an over the counter stool softener.

## 2021-11-17 NOTE — PROGRESS NOTE ADULT - ASSESSMENT
21M from [place] with frequent travel, no PMHx, p/w 2 week h/o fever, night sweats, and productive cough, found to have right sided loculated pleural effusion, tree-in-bud nodules, and bronchiectasis on CT scan, as well as positive QuantiFeron gold test. Now s/p chest tube drainage of 1.3L serous cloudy fluid. Sputum AFB smears have been negative x3. Disease presentation is concerning for active TB. TB positive in sputum by PCR on 11/14. Pleural fluid sample, TB PCR not detected. AFB cultures are pending, likely will take 4-6 weeks to result. Will start RIPE treatment for active TB and follow up TB cultures.       #Active Pulmonary Tuberculosis     Recommend:  1. START treatment for active TB with RIPE: (weight based)          -Rifampin  600mg qd          -Isoniazid 300mg qd          -pyrazinamide 1000mg qd          -ethambutol 800mg qd          Also start Pyridoxine (Vitamin B6) 50 mg PO daily  2. Send HIV Ag/Ab test  3. Requires eye exam while on treatment with ethambutol  4. Monitor LFTs while on treatment   5. F/u AFB culture  6. F/u Fungitell, Aspergillus Galactomannan Ag, ADA  7. Monitor inpatient while on initiation of treatment     Will need to contact department of health regarding need for isolation duration in setting of negative AFB sputum/ initiation of treatment.     ID Team 1 following    Plan discussed with ID attending.  21M from [place] with frequent travel, no PMHx, presents with fever, night sweats, and productive cough, found to have right sided loculated pleural effusion, tree-in-bud nodules, and bronchiectasis on CT scan, as well as positive QuantiFeron gold test, being managed for Active pulmonary tuberculosis. Now s/p chest tube drainage of 1.3L serous cloudy fluid. Sputum AFB smears have been negative x3.  TB positive in sputum by PCR on 11/14. Pleural fluid sample, TB PCR not detected. AFB cultures are pending, likely will take 4-6 weeks to result. S/p Initiation of RIPE treatment for active TB with normal LFTs.       #Active Pulmonary Tuberculosis     Recommend:  1. Continue with treatment for active TB with RIPE: (weight based)          -Rifampin  600mg qd          -Isoniazid 300mg qd          -pyrazinamide 1000mg qd          -ethambutol 800mg qd         And continue Pyridoxine (Vitamin B6) 50 mg PO daily  2. Send HIV Ag/Ab test  3. Requires eye exam while on treatment with ethambutol (per patient this was done recently)   4. Monitor LFTs while on treatment   5. F/u AFB culture  6. Monitor inpatient while on initiation of treatment   7. Will need to follow up with Dr. Carrasco in 2 weeks once discharge, [phone]     Will need to contact department of health regarding need for isolation duration in setting of negative AFB sputum/ initiation of treatment.   From ID standpoint can discharge tomorrow if patient tolerates medication.     ID Team 1 following    Plan discussed with ID attending.

## 2021-11-17 NOTE — DISCHARGE NOTE PROVIDER - DETAILS OF MALNUTRITION DIAGNOSIS/DIAGNOSES
This patient has been assessed with a concern for Malnutrition and was treated during this hospitalization for the following Nutrition diagnosis/diagnoses:     -  11/13/2021: Moderate protein-calorie malnutrition   -  11/13/2021: Underweight (BMI < 19)

## 2021-11-17 NOTE — PROGRESS NOTE ADULT - SUBJECTIVE AND OBJECTIVE BOX
OVERNIGHT EVENTS:    SUBJECTIVE / INTERVAL HPI: Patient seen and examined at bedside.     VITAL SIGNS:  Vital Signs Last 24 Hrs  T(C): 36.7 (17 Nov 2021 06:00), Max: 37.6 (16 Nov 2021 21:21)  T(F): 98.1 (17 Nov 2021 06:00), Max: 99.7 (16 Nov 2021 21:21)  HR: 76 (17 Nov 2021 06:00) (76 - 109)  BP: 93/59 (17 Nov 2021 06:00) (85/52 - 93/59)  BP(mean): --  RR: 20 (17 Nov 2021 06:00) (18 - 20)  SpO2: 99% (17 Nov 2021 06:00) (97% - 99%)  I&O's Summary      PHYSICAL EXAM:    General: NAD, lying comfortably in bed   HEENT: Anicteric sclera, EOMI, MMM  Cardiovascular: +S1/S2; RRR; No JVD   Respiratory: CTAB, no accessory muscle use  Gastrointestinal: soft, non-distended, no tenderness to palpation  Extremities: WWP; no edema, no erythema, no tenderness to palpation  Vascular: 2+ radial, DP/PT pulses B/L  Neurological: AAOx3; no focal deficits  Skin: No visible rash, skin discoloration    MEDICATIONS:  MEDICATIONS  (STANDING):  ethambutol 800 milliGRAM(s) Oral every 24 hours  ferrous    sulfate 325 milliGRAM(s) Oral daily  influenza   Vaccine 0.5 milliLiter(s) IntraMuscular once  isoniazid 300 milliGRAM(s) Oral every 24 hours  pyrazinamide 1000 milliGRAM(s) Oral every 24 hours  pyridoxine 50 milliGRAM(s) Oral every 24 hours  rifAMPin 600 milliGRAM(s) Oral every 24 hours    MEDICATIONS  (PRN):  acetaminophen     Tablet .. 650 milliGRAM(s) Oral every 6 hours PRN Temp greater or equal to 38C (100.4F), Mild Pain (1 - 3)  ondansetron Injectable 4 milliGRAM(s) IV Push every 6 hours PRN Nausea and/or Vomiting      ALLERGIES:  Allergies    No Known Allergies    Intolerances        LABS:                        11.9   5.46  )-----------( 399      ( 16 Nov 2021 07:24 )             38.4     11-16    135  |  99  |  18  ----------------------------<  94  4.0   |  24  |  0.75    Ca    9.2      16 Nov 2021 07:24  Phos  4.4     11-16  Mg     2.0     11-16          CAPILLARY BLOOD GLUCOSE          RADIOLOGY & ADDITIONAL TESTS: Reviewed.   OVERNIGHT EVENTS: None    SUBJECTIVE / INTERVAL HPI: Patient seen and examined at bedside. Patient reports feeling comfortable and slept well overnight. States he is anxious to return to school. Denies cough, SOB, fever, night sweats, pain at chest tube site.     VITAL SIGNS:  Vital Signs Last 24 Hrs  T(C): 36.7 (17 Nov 2021 06:00), Max: 37.6 (16 Nov 2021 21:21)  T(F): 98.1 (17 Nov 2021 06:00), Max: 99.7 (16 Nov 2021 21:21)  HR: 76 (17 Nov 2021 06:00) (76 - 109)  BP: 93/59 (17 Nov 2021 06:00) (85/52 - 93/59)  BP(mean): --  RR: 20 (17 Nov 2021 06:00) (18 - 20)  SpO2: 99% (17 Nov 2021 06:00) (97% - 99%)  I&O's Summary      PHYSICAL EXAM:    General: NAD, lying comfortably in bed   HEENT: Anicteric sclera, EOMI, MMM  Cardiovascular: +S1/S2; RRR; No JVD   Respiratory: CTAB, no accessory muscle use  Gastrointestinal: soft, non-distended, no tenderness to palpation  Extremities: WWP; no edema, no erythema, no tenderness to palpation  Vascular: 2+ radial, DP/PT pulses B/L  Neurological: AAOx3; no focal deficits  Skin: No visible rash, skin discoloration    MEDICATIONS:  MEDICATIONS  (STANDING):  ethambutol 800 milliGRAM(s) Oral every 24 hours  ferrous    sulfate 325 milliGRAM(s) Oral daily  influenza   Vaccine 0.5 milliLiter(s) IntraMuscular once  isoniazid 300 milliGRAM(s) Oral every 24 hours  pyrazinamide 1000 milliGRAM(s) Oral every 24 hours  pyridoxine 50 milliGRAM(s) Oral every 24 hours  rifAMPin 600 milliGRAM(s) Oral every 24 hours    MEDICATIONS  (PRN):  acetaminophen     Tablet .. 650 milliGRAM(s) Oral every 6 hours PRN Temp greater or equal to 38C (100.4F), Mild Pain (1 - 3)  ondansetron Injectable 4 milliGRAM(s) IV Push every 6 hours PRN Nausea and/or Vomiting      ALLERGIES:  Allergies    No Known Allergies    Intolerances        LABS:                          11.4   4.54  )-----------( 368      ( 17 Nov 2021 08:22 )             37.0          11-17    136  |  100  |  17  ----------------------------<  91  4.4   |  26  |  0.86    Ca    9.1      17 Nov 2021 08:22  Phos  5.0     11-17  Mg     2.2     11-17    TPro  7.3  /  Alb  3.2<L>  /  TBili  0.5  /  DBili  x   /  AST  14  /  ALT  8<L>  /  AlkPhos  83  11-17            CAPILLARY BLOOD GLUCOSE          RADIOLOGY & ADDITIONAL TESTS: Reviewed.   OVERNIGHT EVENTS: No acute events. Pt remained afebrile and hemodynamically stable.     SUBJECTIVE / INTERVAL HPI: Patient seen and examined at bedside. Patient reports feeling comfortable and slept well overnight. States he is anxious to return to school. Denies cough, SOB, fever, night sweats, pain at chest tube site.     VITAL SIGNS:  Vital Signs Last 24 Hrs  T(C): 36.7 (17 Nov 2021 06:00), Max: 37.6 (16 Nov 2021 21:21)  T(F): 98.1 (17 Nov 2021 06:00), Max: 99.7 (16 Nov 2021 21:21)  HR: 76 (17 Nov 2021 06:00) (76 - 109)  BP: 93/59 (17 Nov 2021 06:00) (85/52 - 93/59)  BP(mean): --  RR: 20 (17 Nov 2021 06:00) (18 - 20)  SpO2: 99% (17 Nov 2021 06:00) (97% - 99%)  I&O's Summary      PHYSICAL EXAM:    General: NAD, lying comfortably in bed   HEENT: Anicteric sclera, EOMI, MMM  Cardiovascular: +S1/S2; RRR; No JVD   Respiratory: CTAB, no accessory muscle use  Gastrointestinal: soft, non-distended, no tenderness to palpation  Extremities: WWP; no edema, no erythema, no tenderness to palpation  Vascular: 2+ radial, DP/PT pulses B/L  Neurological: AAOx3; no focal deficits  Skin: No visible rash, skin discoloration    MEDICATIONS:  MEDICATIONS  (STANDING):  ethambutol 800 milliGRAM(s) Oral every 24 hours  ferrous    sulfate 325 milliGRAM(s) Oral daily  influenza   Vaccine 0.5 milliLiter(s) IntraMuscular once  isoniazid 300 milliGRAM(s) Oral every 24 hours  pyrazinamide 1000 milliGRAM(s) Oral every 24 hours  pyridoxine 50 milliGRAM(s) Oral every 24 hours  rifAMPin 600 milliGRAM(s) Oral every 24 hours    MEDICATIONS  (PRN):  acetaminophen     Tablet .. 650 milliGRAM(s) Oral every 6 hours PRN Temp greater or equal to 38C (100.4F), Mild Pain (1 - 3)  ondansetron Injectable 4 milliGRAM(s) IV Push every 6 hours PRN Nausea and/or Vomiting      ALLERGIES:  Allergies    No Known Allergies    Intolerances        LABS:                          11.4   4.54  )-----------( 368      ( 17 Nov 2021 08:22 )             37.0          11-17    136  |  100  |  17  ----------------------------<  91  4.4   |  26  |  0.86    Ca    9.1      17 Nov 2021 08:22  Phos  5.0     11-17  Mg     2.2     11-17    TPro  7.3  /  Alb  3.2<L>  /  TBili  0.5  /  DBili  x   /  AST  14  /  ALT  8<L>  /  AlkPhos  83  11-17        Quantiferon TB Plus: POSITIVE: Test repeated.     CAPILLARY BLOOD GLUCOSE          RADIOLOGY & ADDITIONAL TESTS: Reviewed.

## 2021-11-17 NOTE — DISCHARGE NOTE PROVIDER - PROVIDER TOKENS
PROVIDER:[[id number]],FOLLOWUP:[1 month]] PROVIDER:[[id number]],SCHEDULEDAPPT:[12/09/2021],SCHEDULEDAPPTTIME:[09:00 AM]]

## 2021-11-17 NOTE — PROGRESS NOTE ADULT - SUBJECTIVE AND OBJECTIVE BOX
INFECTIOUS DISEASE PROGRESS NOTE:     INTERVAL HPI/OVERNIGHT EVENTS:    OVERNIGHT: No overnight events.  SUBJECTIVE: Patient seen and examined at bedside.     REVIEW OF SYSTEMS:    CONSTITUTIONAL: No weakness, fevers or chills  EYES/ENT: No visual changes;  No vertigo or throat pain   NECK: No pain or stiffness  RESPIRATORY: No cough, wheezing, hemoptysis; No shortness of breath  CARDIOVASCULAR: No chest pain or palpitations  GASTROINTESTINAL: No abdominal or epigastric pain. No nausea, vomiting, or hematemesis; No diarrhea or constipation. No melena or hematochezia.  GENITOURINARY: No dysuria, frequency or hematuria  NEUROLOGICAL: No numbness or weakness  SKIN: No itching, burning, rashes, or lesions   MSK: no joint pain, no joint swelling  All other review of systems is negative unless indicated above.      Allergies    No Known Allergies    Intolerances        ANTIBIOTICS/RELEVANT:  antimicrobials  ethambutol 800 milliGRAM(s) Oral every 24 hours  isoniazid 300 milliGRAM(s) Oral every 24 hours  pyrazinamide 1000 milliGRAM(s) Oral every 24 hours  rifAMPin 600 milliGRAM(s) Oral every 24 hours    immunologic:  influenza   Vaccine 0.5 milliLiter(s) IntraMuscular once    OTHER:  acetaminophen     Tablet .. 650 milliGRAM(s) Oral every 6 hours PRN  ferrous    sulfate 325 milliGRAM(s) Oral daily  ondansetron Injectable 4 milliGRAM(s) IV Push every 6 hours PRN  pyridoxine 50 milliGRAM(s) Oral every 24 hours      Objective:  Vital Signs Last 24 Hrs  T(C): 36.7 (17 Nov 2021 06:00), Max: 37.6 (16 Nov 2021 21:21)  T(F): 98.1 (17 Nov 2021 06:00), Max: 99.7 (16 Nov 2021 21:21)  HR: 76 (17 Nov 2021 06:00) (76 - 109)  BP: 93/59 (17 Nov 2021 06:00) (85/52 - 93/59)  BP(mean): --  RR: 20 (17 Nov 2021 06:00) (18 - 20)  SpO2: 99% (17 Nov 2021 06:00) (97% - 99%)      PHYSICAL EXAM:    Constitutional: WDWN resting comfortably in bed; NAD  Head: NC/AT  Eyes: PERRLA, EOMI, clear conjunctiva  ENT: no nasal discharge; no oropharyngeal erythema or exudates; dry oral mucosa  Neck: supple; no JVD or thyromegaly  Respiratory: CTA B/L; no W/R/R, no retractions  Cardiac: +S1/S2; RRR; no M/R/G; PMI non-displaced  Gastrointestinal: soft, NT/ND; no rebound or guarding; +BS, no hepatosplenomegaly  Extremities: WWP, no clubbing or cyanosis; no peripheral edema  Vascular: 2+ radial, DP/PT pulses B/L  Lymphatic: no submandibular or cervical LAD  Skin: no rash, no ulcers  Neurologic: AAOx3; answers questions appropriately, follows commands, moves all extremities, CNII-XII grossly intact; no focal deficits, motor 5/5 in UE and LE, Reflexes 2+ in UE and LE b/l        LABS:                        11.9   5.46  )-----------( 399      ( 16 Nov 2021 07:24 )             38.4     11-16    135  |  99  |  18  ----------------------------<  94  4.0   |  24  |  0.75    Ca    9.2      16 Nov 2021 07:24  Phos  4.4     11-16  Mg     2.0     11-16            MICROBIOLOGY:            RECENT CULTURES:  11-16 @ 01:11  .Sputum Sputum  --  --  --  --  --  11-15 @ 11:28  .Sputum Sputum  --  --  --    Sputum specimen rejected.  Microscopic examination indicates  oropharyngeal contamination.  Please repeat.  --  11-15 @ 04:05  .Sputum Sputum  --  --  --  --  --  11-14 @ 21:01  .Sputum Sputum  --  --  --    Sputum specimen rejected.  Microscopic examination indicates  oropharyngeal contamination.  Please repeat.  --  11-14 @ 20:55  .Sputum Sputum  --  --  --  --  --  11-14 @ 06:33  .Sputum Sputum  --  --  --    Testing in progress  --  11-13 @ 21:56  Pleural Fl right pleural effusion  --  --  --    No growth to date.  --  11-13 @ 15:42  .Sputum Sputum  --  --  --    Sputum specimen rejected.  Microscopic examination indicates  oropharyngeal contamination.  Please repeat.  --  11-12 @ 21:12  .Blood Blood  --  --  --    No growth at 4 days.  --      RADIOLOGY & ADDITIONAL STUDIES: INFECTIOUS DISEASE PROGRESS NOTE:     INTERVAL HPI/OVERNIGHT EVENTS:    OVERNIGHT: No overnight events.  SUBJECTIVE: Patient seen and examined at bedside. Has remained afebrile, tolerated medications well yesterday, no complaints Cough resolved.      REVIEW OF SYSTEMS:    CONSTITUTIONAL: No weakness, fevers or chills  EYES/ENT: No visual changes;  No vertigo or throat pain   NECK: No pain or stiffness  RESPIRATORY: No cough, wheezing, hemoptysis; No shortness of breath  CARDIOVASCULAR: No chest pain or palpitations  GASTROINTESTINAL: No abdominal or epigastric pain. No nausea, vomiting, or hematemesis; No diarrhea or constipation. No melena or hematochezia.  GENITOURINARY: No dysuria, frequency or hematuria  NEUROLOGICAL: No numbness or weakness  SKIN: No itching, burning, rashes, or lesions   MSK: no joint pain, no joint swelling  All other review of systems is negative unless indicated above.      Allergies    No Known Allergies    Intolerances        ANTIBIOTICS/RELEVANT:  antimicrobials  ethambutol 800 milliGRAM(s) Oral every 24 hours  isoniazid 300 milliGRAM(s) Oral every 24 hours  pyrazinamide 1000 milliGRAM(s) Oral every 24 hours  rifAMPin 600 milliGRAM(s) Oral every 24 hours    immunologic:  influenza   Vaccine 0.5 milliLiter(s) IntraMuscular once    OTHER:  acetaminophen     Tablet .. 650 milliGRAM(s) Oral every 6 hours PRN  ferrous    sulfate 325 milliGRAM(s) Oral daily  ondansetron Injectable 4 milliGRAM(s) IV Push every 6 hours PRN  pyridoxine 50 milliGRAM(s) Oral every 24 hours      Objective:  Vital Signs Last 24 Hrs  T(C): 36.7 (17 Nov 2021 06:00), Max: 37.6 (16 Nov 2021 21:21)  T(F): 98.1 (17 Nov 2021 06:00), Max: 99.7 (16 Nov 2021 21:21)  HR: 76 (17 Nov 2021 06:00) (76 - 109)  BP: 93/59 (17 Nov 2021 06:00) (85/52 - 93/59)  BP(mean): --  RR: 20 (17 Nov 2021 06:00) (18 - 20)  SpO2: 99% (17 Nov 2021 06:00) (97% - 99%)      PHYSICAL EXAM:    Constitutional: WDWN resting comfortably in bed; NAD  Head: NC/AT  Eyes: PERRLA, EOMI, clear conjunctiva  ENT: no nasal discharge; no oropharyngeal erythema or exudates; dry oral mucosa  Neck: supple; no JVD or thyromegaly  Respiratory: CTA B/L; no W/R/R, no retractions  Cardiac: +S1/S2; RRR; no M/R/G; PMI non-displaced  Gastrointestinal: soft, NT/ND; no rebound or guarding; +BS, no hepatosplenomegaly  Extremities: WWP, no clubbing or cyanosis; no peripheral edema  Vascular: 2+ radial, DP/PT pulses B/L  Lymphatic: no submandibular or cervical LAD  Skin: no rash, no ulcers  Neurologic: AAOx3;        LABS:                        11.9   5.46  )-----------( 399      ( 16 Nov 2021 07:24 )             38.4     11-16    135  |  99  |  18  ----------------------------<  94  4.0   |  24  |  0.75    Ca    9.2      16 Nov 2021 07:24  Phos  4.4     11-16  Mg     2.0     11-16            MICROBIOLOGY:            RECENT CULTURES:  11-16 @ 01:11  .Sputum Sputum  --  --  --  --  --  11-15 @ 11:28  .Sputum Sputum  --  --  --    Sputum specimen rejected.  Microscopic examination indicates  oropharyngeal contamination.  Please repeat.  --  11-15 @ 04:05  .Sputum Sputum  --  --  --  --  --  11-14 @ 21:01  .Sputum Sputum  --  --  --    Sputum specimen rejected.  Microscopic examination indicates  oropharyngeal contamination.  Please repeat.  --  11-14 @ 20:55  .Sputum Sputum  --  --  --  --  --  11-14 @ 06:33  .Sputum Sputum  --  --  --    Testing in progress  --  11-13 @ 21:56  Pleural Fl right pleural effusion  --  --  --    No growth to date.  --  11-13 @ 15:42  .Sputum Sputum  --  --  --    Sputum specimen rejected.  Microscopic examination indicates  oropharyngeal contamination.  Please repeat.  --  11-12 @ 21:12  .Blood Blood  --  --  --    No growth at 4 days.  --      RADIOLOGY & ADDITIONAL STUDIES:

## 2021-11-17 NOTE — PROGRESS NOTE ADULT - ASSESSMENT
Pt is a 22 y/o M w/ no pmhx presenting with complaints of cough x5 days, found to have right sided pleural effusion. Pt admitted for further eval/management of pleural effusion and r/o TB, malignant lymphoma, and fungal etiology.      Pt is a 22 y/o M w/ no pmhx presenting with complaints of cough x5 days, found to have right sided pleural effusion. Pt admitted for further eval/management of pleural effusion found to be Mycobacterium Tuberculosis.      Pt is a 20 y/o M w/ no pmhx presenting with complaints of cough x5 days, found to have right sided pleural effusion. Pt admitted for further eval/management of pleural effusion found to be Mycobacterium Tuberculosis, on RIPE therapy.

## 2021-11-17 NOTE — DISCHARGE NOTE PROVIDER - HOSPITAL COURSE
#Discharge: do not delete    Patient is __ yo M/F with past medical history of _____ presented with _____, found to have _____ (one liner)    Hospital course (by problem):   Problem 1: Active Pulmonary TB   - presented w/   - found to have   - treated w/   Plan:       Problem 2: Pleural Effusion     Problem 3: Anemia   -    Plan:     Patient was discharged to: (home/[facility]/acute rehab/hospice, etc, and with what services – home health PT/RN? Home O2?)    New medications:   Changes to old medications:  Medications that were stopped:    Items to follow up as outpatient:    Physical exam at the time of discharge:       #Discharge: do not delete    Patient is 22 yo M/F with no past medical history who [place] ago presented with a right sided pleural effusion found to be Mycobacterium Tuberculosis.     Hospital course (by problem):   Problem 1: Active Pulmonary TB   - presented w/cough x5 days and right sided pleural effusion   - found to have Mycobacterium Tuberculosis   - treated w/RIPE therapy and Pyridoxin   Plan:   - 3/3 AFB smears negative, procalcitonin elevated to 0.18  - Streptococcus pneumoniae Ag urine negative   - Fungitell negative  - Sputum PCR for mycobacterium tuberculosis positive (11/16)  - Rifampin resistance negative   - QuantiFeron positive (performed 11/11, resulted 11/15)  - HIV Ag/Ab nonreactive   - ADA elevated (50 U/L)  - f/u AFB culture, Aspergillus Galactomannan Ag  - Rifampin 600mg daily, Isoniazid 300mg daily, Pyrazinamide 1000mg daily, Ethambutol 800mg daily, Pyridoxin 50mg daily  - COMFORT following - instructed 2 weeks of isolation precautions while on active anti-TB medications (started 11/16)  - ID following - Monitor LFTs during treatment; ophthalmologist appointment needed   - Pulm following - follow up needed in 4 weeks to monitor pleural effusion.    Problem 2: Pleural Effusion   - presented w/ right sided pleural effusion   - found to be likely secondary to mycobacterium tuberculosis   - treated w/ chest tube placement and drainage   Plan:  - s/p chest tube placement w/ drainage of serous fluid. d/c on 11/15  - Pleural fluid analysis consistent w/ exudative effusion   - Cytology negative   - Pulm following - follow up for monitoring of resolution of pleural effusion needed in 4 weeks. If at [facility], appointment can be made with Dr. Vega otherwise follow up with pulmonologist through PCP is appropriate.    Problem 3: Anemia   - Presented w/ Hb 11.9 w/ MCV 73.6. No s/s of bleeding.   - Found to have iron deficiency anemia   - treated w/ ferrous sulfate 325mg daily   Plan:   - encouraged to increase dietary intake of green leafy vegetables and fruits  - recommend outpatient follow-up in 4-6 weeks w/ potential over the counter iron supplementation.    Patient was discharged to: home    New medications:   -Rifampin 600mg  -Isoniazid 300mg  -Pyrazinamide 1000mg  -Ethambutol 800mg    Changes to old medications: n/a  Medications that were stopped: n/a    Items to follow up as outpatient:  - LFT monitoring during RIPE therapy   - Ophthalmologist appointment   - Pulmonology for pleural effusion resolution in 4 weeks  - Iron studies in 4-6 weeks w/ potential over the counter iron supplementation    Physical exam at the time of discharge:       #Discharge: do not delete    Patient is 22 yo M/F with no past medical history who [place] presented with a right sided pleural effusion found to be Mycobacterium Tuberculosis.     Hospital course (by problem):   Problem 1: Active Pulmonary TB   - presented w/cough x5 days and right sided pleural effusion   - found to have Mycobacterium Tuberculosis   - treated w/RIPE therapy and Pyridoxine  - 3/3 AFB smears negative, procalcitonin elevated to 0.18  - Streptococcus pneumoniae Ag urine negative   - Fungitell negative  - Sputum PCR for mycobacterium tuberculosis positive (11/16)  - Rifampin resistance negative   - QuantiFeron positive (performed 11/11, resulted 11/15)  - HIV Ag/Ab nonreactive   - ADA elevated (50 U/L)    Plan:   - AFB cultures pending   - Rifampin 600mg daily, Isoniazid 300mg daily, Pyrazinamide 1000mg daily, Ethambutol 800mg daily, Pyridoxin 50mg daily  - COMFORT following - instructed 2 weeks of isolation precautions while on active anti-TB medications (started 11/16)  - ID following - Monitor LFTs during treatment; ophthalmologist follow up as  outpatient   - Pulm following - follow up needed in 4 weeks to monitor pleural effusion.    Problem 2: Pleural Effusion   - presented w/ right sided pleural effusion   - found to be likely secondary to mycobacterium tuberculosis   - treated w/ chest tube placement and drainage   - s/p chest tube placement w/ drainage of serous fluid. d/c on 11/15  - Pleural fluid analysis consistent w/ exudative effusion   - Cytology negative     Plan:  - Pulm following - follow up for monitoring of resolution of pleural effusion needed in 4 weeks. If at [facility], appointment can be made with Dr. Vega otherwise follow up with pulmonologist through PCP is appropriate.    Problem 3: Anemia   - Presented w/ Hb 11.9 w/ MCV 73.6. No s/s of bleeding.   - Found to have iron deficiency anemia   - treated w/ ferrous sulfate 325mg daily     Plan:   - encouraged to increase dietary intake of green leafy vegetables and fruits  - recommend outpatient follow-up in 4-6 weeks w/ potential over the counter iron supplementation.    Patient was discharged to: home    New medications:   -Rifampin 600mg  -Isoniazid 300mg  -Pyrazinamide 1000mg  -Ethambutol 800mg    Changes to old medications: n/a  Medications that were stopped: n/a    Items to follow up as outpatient:  - LFT monitoring during RIPE therapy   - Ophthalmologist appointment   - Pulmonology for pleural effusion resolution in 4 weeks  - Iron studies in 4-6 weeks w/ potential over the counter iron supplementation  - COMFORT isolation     Physical exam at the time of discharge:        PHYSICAL EXAM:    Constitutional: NAD, awake and alert, well-developed  HEENT: PERR, EOMI, Normal Hearing, MMM  Neck: Soft and supple, No LAD, No JVD  Respiratory: Breath sounds are clear bilaterally, No wheezing, rales or rhonchi  Cardiovascular: S1 and S2, regular rate and rhythm, no Murmurs, gallops or rubs  Gastrointestinal: Bowel Sounds present, soft, nontender, nondistended, no guarding, no rebound  Extremities: No peripheral edema  Vascular: 2+ peripheral pulses  Neurological: A/O x 3, no focal deficits  Musculoskeletal: 5/5 strength b/l upper and lower extremities  Skin: No rashes           #Discharge: do not delete    Patient is 20 yo M/F with no past medical history who [place] ago presented with a right sided pleural effusion found to be Mycobacterium Tuberculosis.     Hospital course (by problem):   Problem 1: Active Pulmonary TB   - presented w/cough x5 days and right sided pleural effusion   - found to have Mycobacterium Tuberculosis   - treated w/RIPE therapy and Pyridoxine  - 3/3 AFB smears negative, procalcitonin elevated to 0.18  - Streptococcus pneumoniae Ag urine negative   - Fungitell negative  - Sputum PCR for mycobacterium tuberculosis positive (11/16)  - Rifampin resistance negative   - QuantiFeron positive (performed 11/11, resulted 11/15)  - HIV Ag/Ab nonreactive   - ADA elevated (50 U/L)    Plan:   - AFB cultures pending   - Rifampin 600mg daily, Isoniazid 300mg daily, Pyrazinamide 1000mg daily, Ethambutol 800mg daily, Pyridoxin 50mg daily  - COMFORT following - instructed 2 weeks of isolation precautions while on active anti-TB medications (started 11/16)  - ID following - Dr Carrasco appointment made with patient directly; ophthalmologist follow up as  outpatient   - Pulm following - Dr. Vega Thursday 12/9/2021 at 9AM    Problem 2: Pleural Effusion   - presented w/ right sided pleural effusion   - found to be likely secondary to mycobacterium tuberculosis   - treated w/ chest tube placement and drainage   - s/p chest tube placement w/ drainage of serous fluid. d/c on 11/15  - Pleural fluid analysis consistent w/ exudative effusion   - Cytology negative     Plan:  - Pulm following - follow up for monitoring of resolution of pleural effusion needed in 4 weeks. If at [facility], appointment can be made with Dr. Vega otherwise follow up with pulmonologist through PCP is appropriate.    Problem 3: Anemia   - Presented w/ Hb 11.9 w/ MCV 73.6. No s/s of bleeding.   - Found to have iron deficiency anemia   - treated w/ ferrous sulfate 325mg daily     Plan:   - encouraged to increase dietary intake of green leafy vegetables and fruits  - recommend outpatient follow-up in 4-6 weeks w/ potential over the counter iron supplementation.    Patient was discharged to: home    New medications:   -Rifampin 600mg  -Isoniazid 300mg  -Pyrazinamide 1000mg  -Ethambutol 800mg    Changes to old medications: n/a  Medications that were stopped: n/a    Items to follow up as outpatient:  - LFT monitoring during RIPE therapy   - Ophthalmologist appointment   - Pulmonology for pleural effusion resolution in 4 weeks  - Iron studies in 4-6 weeks w/ potential over the counter iron supplementation  - COMFORT isolation     Physical exam at the time of discharge:        PHYSICAL EXAM:    Constitutional: NAD, awake and alert, well-developed  HEENT: PERR, EOMI, Normal Hearing, MMM  Neck: Soft and supple, No LAD, No JVD  Respiratory: Breath sounds are clear bilaterally, No wheezing, rales or rhonchi  Cardiovascular: S1 and S2, regular rate and rhythm, no Murmurs, gallops or rubs  Gastrointestinal: Bowel Sounds present, soft, nontender, nondistended, no guarding, no rebound  Extremities: No peripheral edema  Vascular: 2+ peripheral pulses  Neurological: A/O x 3, no focal deficits  Musculoskeletal: 5/5 strength b/l upper and lower extremities  Skin: No rashes

## 2021-11-17 NOTE — PROGRESS NOTE ADULT - PROBLEM SELECTOR PLAN 1
Pt presenting with cough x5 days with also associated fevers as high as 102 x2 weeks. No hemoptysis, weight loss or recent travel or hx of TB. [place] 3 [place] ago. Found to have a moderate right sided effusion w/ loculations on CT scan and ultrasound, concerning for RALPH vs. TB vs malignancy/autoimmune disease     - no antibiotics currently recommended until confirmation of TB due to possibility of resistance   - s/p chest tube placement w/ drainage of serous fluid. d/c on 11/15  - Pleural fluid analysis consistent w/ exudative effusion   - 3/3 AFB smears negative, procalcitonin elevated to 0.18  - Streptococcus pneumoniae Ag urine negative   - Fungitell negative  - f/u Aspergillus Galactomannan Ag, ADA, cytology, HIV Ag/Ab testing   - Infectious Disease following - requested TB PCR of pleural fluid and AFB sputum culture  - Pulm following - consider pleural biopsy via VATS pending TB PCR of pleural fluid and AFB sputum culture results Pt presenting with cough x5 days with also associated fevers as high as 102 x2 weeks. No hemoptysis, weight loss or recent travel or hx of TB. [place] 3 years ago. Found to have a moderate right sided effusion w/ loculations on CT scan and ultrasound, found to be mycobacterium tuberculosis.     - Sputum PCR for mycobacterium tuberculosis positive (11/16)  - Rifampin resistance negative   - QuantiFeron positive (performed 11/11, resulted 11/15)  - f/u AFB culture, ADA, HIV Ag/Ab testing, Aspergillus Galactomannan Ag  - Rifampin 600mg daily, Isoniazid 300mg daily, Pyrazinamide 1000mg daily, Ethambutol 800mg daily, Pyridoxin 50mg daily  - COMFORT following - instructed 2 weeks of isolation precautions while on active anti-TB medications  - ID following - Observe and monitor LFTs for 24 hours on isolation precautions; ophthalmologist appointment needed   - Pulm following - follow up needed in 4 weeks to monitor pleural effusion Pt presenting with cough x5 days with also associated fevers as high as 102 x2 weeks. No hemoptysis, weight loss or recent travel or hx of TB. [place] 3 years ago. Found to have a moderate right sided effusion w/ loculations on CT scan and ultrasound, found to be mycobacterium tuberculosis.     - 3/3 AFB smears negative, procalcitonin elevated to 0.18  - Streptococcus pneumoniae Ag urine negative   - Fungitell negative  - Sputum PCR for mycobacterium tuberculosis positive (11/16)  - Rifampin resistance negative   - QuantiFeron positive (performed 11/11, resulted 11/15)  - f/u AFB culture, ADA, HIV Ag/Ab testing, Aspergillus Galactomannan Ag  - Rifampin 600mg daily, Isoniazid 300mg daily, Pyrazinamide 1000mg daily, Ethambutol 800mg daily, Pyridoxin 50mg daily  - COMFORT following - instructed 2 weeks of isolation precautions while on active anti-TB medications  - ID following - Observe and monitor LFTs for 24 hours on isolation precautions; ophthalmologist appointment needed   - Pulm following - follow up needed in 4 weeks to monitor pleural effusion

## 2021-11-17 NOTE — DISCHARGE NOTE PROVIDER - NSDCMRMEDTOKEN_GEN_ALL_CORE_FT
ethambutol 400 mg oral tablet: 2 tab(s) orally every 24 hours  isoniazid 300 mg oral tablet: 1 tab(s) orally every 24 hours  pyrazinamide 500 mg oral tablet: 2 tab(s) orally every 24 hours  pyridoxine 50 mg oral tablet: 1 tab(s) orally every 24 hours  rifAMPin 300 mg oral capsule: 2 cap(s) orally every 24 hours   ethambutol 400 mg oral tablet: 2 tab(s) orally every 24 hours  ethambutol 400 mg oral tablet: 2 tab(s) orally every 24 hours  isoniazid 300 mg oral tablet: 1 tab(s) orally every 24 hours  isoniazid 300 mg oral tablet: 1 tab(s) orally every 24 hours  pyrazinamide 500 mg oral tablet: 2 tab(s) orally every 24 hours  pyrazinamide 500 mg oral tablet: 2 tab(s) orally every 24 hours  pyridoxine 50 mg oral tablet: 1 tab(s) orally every 24 hours  pyridoxine 50 mg oral tablet: 1 tab(s) orally every 24 hours  rifAMPin 300 mg oral capsule: 2 cap(s) orally every 24 hours  rifAMPin 300 mg oral capsule: 2 cap(s) orally every 24 hours

## 2021-11-17 NOTE — PROGRESS NOTE ADULT - PROBLEM SELECTOR PLAN 3
F: none  E: replete prn  N: regular diet  DVT: None - improve score 0  Dispo: home - Hb 11.9 w/ MCV 73.6. No s/s of bleeding.   - iron studies consistent with iron deficiency anemia   - ferrous sulfate 325mg daily  - encouraged to increase dietary intake of green leafy vegetables and fruits - Hb 11.9 w/ MCV 73.6. No s/s of bleeding.   - iron studies consistent with iron deficiency anemia   - ferrous sulfate 325mg daily  - encouraged to increase dietary intake of green leafy vegetables and fruits  - recommend outpatient follow-up in 4-6 weeks w/ potential over the counter iron supplementation

## 2021-11-17 NOTE — DISCHARGE NOTE PROVIDER - CARE PROVIDER_API CALL
Estefany Vega)  Pulmonary Disease  [address] [place] [address]  Phone: [phone]  Fax: [phone]  Follow Up Time: 1 month   Estefany Vega)  Pulmonary Disease  [address] [place] [address]  Phone: [phone]  Fax: [phone]  Scheduled Appointment: 12/09/2021 09:00 AM

## 2021-11-17 NOTE — PROGRESS NOTE ADULT - ATTENDING COMMENTS
Agree with above.  Can continue RIPE.  Check LFTs in AM on 11/18.  If normal, ok to go from my standpoint.  He has follow up with me on 12/1/21 at 10:30 am at [address] [place] [address], [address] [place], [place], [phone], option 2

## 2021-11-17 NOTE — PROGRESS NOTE ADULT - ATTENDING COMMENTS
Patient was seen and examined with the resident team today.  I agree with Dr. Zimmerman's assessment and plan with the following exceptions/additions:     Briefly, this is a 22yo gentleman with no PMH, native of [place] w/extensive international travel, who p/w night sweats and productive cough associated with dyspnea, found to have a R-sided pleural effusion and tree-and-bud lesions on chest CT c/f TB.  He had a [facility] QuantiFeron on 11/11 that resulted positive on 11/15.  PCR testing here confirming active TB diagnosis; thus; started on RIPE on 11/16.     #TB R/O - c/w RIPE, ID would like to observe for any GEORGES for an additional 24 hours; please ensure f/u with ID and Pulm   #R-sided, exudative pleural effusion - s/p chest tube; Pulm following, appreciate assistance  #ZI - no signs or symptoms of acute or chronic blood loss; should have iron panel re-checked in a few weeks   #DVT PPx - ambulatory  #Dispo - home tomorrow with isolation     Minda Bernal  [phone]

## 2021-11-17 NOTE — PROGRESS NOTE ADULT - PROBLEM SELECTOR PLAN 2
- Hb 11.9 w/ MCV 73.6. No s/s of bleeding.   - iron studies consistent with iron deficiency anemia   - ferrous sulfate 325mg daily  - encouraged to increase dietary intake of green leafy vegetables and fruits Likely secondary to mycobacterium tuberculosis    - s/p chest tube placement w/ drainage of serous fluid. d/c on 11/15  - Pleural fluid analysis consistent w/ exudative effusion   - 3/3 AFB smears negative, procalcitonin elevated to 0.18  - Streptococcus pneumoniae Ag urine negative   - Fungitell negative  - f/u Aspergillus Galactomannan Ag, ADA, cytology, HIV Ag/Ab testing   - Pulm following - follow up for monitoring of resolution of pleural effusion needed in 4 weeks. If at [facility], appointment can be made with Dr. Vega otherwise follow up with pulmonologist through PCP is appropriate Likely secondary to mycobacterium tuberculosis.     - s/p chest tube placement w/ drainage of serous fluid. d/c on 11/15  - Pleural fluid analysis consistent w/ exudative effusion   - Cytology negative   - Pulm following - follow up for monitoring of resolution of pleural effusion needed in 4 weeks. If at [facility], appointment can be made with Dr. Vega otherwise follow up with pulmonologist through PCP is appropriate

## 2021-11-18 ENCOUNTER — TRANSCRIPTION ENCOUNTER (OUTPATIENT)
Age: 21
End: 2021-11-18

## 2021-11-18 VITALS
RESPIRATION RATE: 20 BRPM | TEMPERATURE: 99 F | SYSTOLIC BLOOD PRESSURE: 99 MMHG | OXYGEN SATURATION: 98 % | DIASTOLIC BLOOD PRESSURE: 64 MMHG | HEART RATE: 78 BPM

## 2021-11-18 LAB
ALBUMIN SERPL ELPH-MCNC: 3 G/DL — LOW (ref 3.3–5)
ALP SERPL-CCNC: 86 U/L — SIGNIFICANT CHANGE UP (ref 40–120)
ALT FLD-CCNC: <5 U/L — LOW (ref 10–45)
ANION GAP SERPL CALC-SCNC: 9 MMOL/L — SIGNIFICANT CHANGE UP (ref 5–17)
AST SERPL-CCNC: 17 U/L — SIGNIFICANT CHANGE UP (ref 10–40)
BILIRUB SERPL-MCNC: 0.3 MG/DL — SIGNIFICANT CHANGE UP (ref 0.2–1.2)
BUN SERPL-MCNC: 16 MG/DL — SIGNIFICANT CHANGE UP (ref 7–23)
CALCIUM SERPL-MCNC: 8.8 MG/DL — SIGNIFICANT CHANGE UP (ref 8.4–10.5)
CHLORIDE SERPL-SCNC: 104 MMOL/L — SIGNIFICANT CHANGE UP (ref 96–108)
CO2 SERPL-SCNC: 24 MMOL/L — SIGNIFICANT CHANGE UP (ref 22–31)
CREAT SERPL-MCNC: 0.71 MG/DL — SIGNIFICANT CHANGE UP (ref 0.5–1.3)
CULTURE RESULTS: NO GROWTH — SIGNIFICANT CHANGE UP
GLUCOSE SERPL-MCNC: 92 MG/DL — SIGNIFICANT CHANGE UP (ref 70–99)
HCT VFR BLD CALC: 36.4 % — LOW (ref 39–50)
HGB BLD-MCNC: 11 G/DL — LOW (ref 13–17)
MAGNESIUM SERPL-MCNC: 2.1 MG/DL — SIGNIFICANT CHANGE UP (ref 1.6–2.6)
MCHC RBC-ENTMCNC: 22.5 PG — LOW (ref 27–34)
MCHC RBC-ENTMCNC: 30.2 GM/DL — LOW (ref 32–36)
MCV RBC AUTO: 74.6 FL — LOW (ref 80–100)
NRBC # BLD: 0 /100 WBCS — SIGNIFICANT CHANGE UP (ref 0–0)
PHOSPHATE SERPL-MCNC: 4.3 MG/DL — SIGNIFICANT CHANGE UP (ref 2.5–4.5)
PLATELET # BLD AUTO: 394 K/UL — SIGNIFICANT CHANGE UP (ref 150–400)
POTASSIUM SERPL-MCNC: 4.4 MMOL/L — SIGNIFICANT CHANGE UP (ref 3.5–5.3)
POTASSIUM SERPL-SCNC: 4.4 MMOL/L — SIGNIFICANT CHANGE UP (ref 3.5–5.3)
PROT SERPL-MCNC: 7.2 G/DL — SIGNIFICANT CHANGE UP (ref 6–8.3)
RBC # BLD: 4.88 M/UL — SIGNIFICANT CHANGE UP (ref 4.2–5.8)
RBC # FLD: 15.7 % — HIGH (ref 10.3–14.5)
SODIUM SERPL-SCNC: 137 MMOL/L — SIGNIFICANT CHANGE UP (ref 135–145)
SPECIMEN SOURCE: SIGNIFICANT CHANGE UP
WBC # BLD: 5.04 K/UL — SIGNIFICANT CHANGE UP (ref 3.8–10.5)
WBC # FLD AUTO: 5.04 K/UL — SIGNIFICANT CHANGE UP (ref 3.8–10.5)

## 2021-11-18 PROCEDURE — 86769 SARS-COV-2 COVID-19 ANTIBODY: CPT

## 2021-11-18 PROCEDURE — 87075 CULTR BACTERIA EXCEPT BLOOD: CPT

## 2021-11-18 PROCEDURE — 87389 HIV-1 AG W/HIV-1&-2 AB AG IA: CPT

## 2021-11-18 PROCEDURE — 82728 ASSAY OF FERRITIN: CPT

## 2021-11-18 PROCEDURE — 80074 ACUTE HEPATITIS PANEL: CPT

## 2021-11-18 PROCEDURE — 85610 PROTHROMBIN TIME: CPT

## 2021-11-18 PROCEDURE — 87070 CULTURE OTHR SPECIMN AEROBIC: CPT

## 2021-11-18 PROCEDURE — 89051 BODY FLUID CELL COUNT: CPT

## 2021-11-18 PROCEDURE — 87206 SMEAR FLUORESCENT/ACID STAI: CPT

## 2021-11-18 PROCEDURE — 87102 FUNGUS ISOLATION CULTURE: CPT

## 2021-11-18 PROCEDURE — 36415 COLL VENOUS BLD VENIPUNCTURE: CPT

## 2021-11-18 PROCEDURE — 87798 DETECT AGENT NOS DNA AMP: CPT

## 2021-11-18 PROCEDURE — 0225U NFCT DS DNA&RNA 21 SARSCOV2: CPT

## 2021-11-18 PROCEDURE — 80053 COMPREHEN METABOLIC PANEL: CPT

## 2021-11-18 PROCEDURE — 83550 IRON BINDING TEST: CPT

## 2021-11-18 PROCEDURE — 85045 AUTOMATED RETICULOCYTE COUNT: CPT

## 2021-11-18 PROCEDURE — 83735 ASSAY OF MAGNESIUM: CPT

## 2021-11-18 PROCEDURE — 88305 TISSUE EXAM BY PATHOLOGIST: CPT

## 2021-11-18 PROCEDURE — 88312 SPECIAL STAINS GROUP 1: CPT

## 2021-11-18 PROCEDURE — 82746 ASSAY OF FOLIC ACID SERUM: CPT

## 2021-11-18 PROCEDURE — 99232 SBSQ HOSP IP/OBS MODERATE 35: CPT | Mod: GC

## 2021-11-18 PROCEDURE — 87116 MYCOBACTERIA CULTURE: CPT

## 2021-11-18 PROCEDURE — 87040 BLOOD CULTURE FOR BACTERIA: CPT

## 2021-11-18 PROCEDURE — 83540 ASSAY OF IRON: CPT

## 2021-11-18 PROCEDURE — 88112 CYTOPATH CELL ENHANCE TECH: CPT

## 2021-11-18 PROCEDURE — 83615 LACTATE (LD) (LDH) ENZYME: CPT

## 2021-11-18 PROCEDURE — 99285 EMERGENCY DEPT VISIT HI MDM: CPT

## 2021-11-18 PROCEDURE — 84145 PROCALCITONIN (PCT): CPT

## 2021-11-18 PROCEDURE — 71045 X-RAY EXAM CHEST 1 VIEW: CPT

## 2021-11-18 PROCEDURE — 82570 ASSAY OF URINE CREATININE: CPT

## 2021-11-18 PROCEDURE — 86480 TB TEST CELL IMMUN MEASURE: CPT

## 2021-11-18 PROCEDURE — 82042 OTHER SOURCE ALBUMIN QUAN EA: CPT

## 2021-11-18 PROCEDURE — 84311 SPECTROPHOTOMETRY: CPT

## 2021-11-18 PROCEDURE — 84100 ASSAY OF PHOSPHORUS: CPT

## 2021-11-18 PROCEDURE — 83986 ASSAY PH BODY FLUID NOS: CPT

## 2021-11-18 PROCEDURE — 71250 CT THORAX DX C-: CPT | Mod: MA

## 2021-11-18 PROCEDURE — 87205 SMEAR GRAM STAIN: CPT

## 2021-11-18 PROCEDURE — 84478 ASSAY OF TRIGLYCERIDES: CPT

## 2021-11-18 PROCEDURE — 85730 THROMBOPLASTIN TIME PARTIAL: CPT

## 2021-11-18 PROCEDURE — 99239 HOSP IP/OBS DSCHRG MGMT >30: CPT | Mod: GC

## 2021-11-18 PROCEDURE — 87305 ASPERGILLUS AG IA: CPT

## 2021-11-18 PROCEDURE — 87899 AGENT NOS ASSAY W/OPTIC: CPT

## 2021-11-18 PROCEDURE — 82607 VITAMIN B-12: CPT

## 2021-11-18 PROCEDURE — 93005 ELECTROCARDIOGRAM TRACING: CPT

## 2021-11-18 PROCEDURE — 85027 COMPLETE CBC AUTOMATED: CPT

## 2021-11-18 PROCEDURE — 94640 AIRWAY INHALATION TREATMENT: CPT

## 2021-11-18 PROCEDURE — 86606 ASPERGILLUS ANTIBODY: CPT

## 2021-11-18 PROCEDURE — 84157 ASSAY OF PROTEIN OTHER: CPT

## 2021-11-18 PROCEDURE — 87015 SPECIMEN INFECT AGNT CONCNTJ: CPT

## 2021-11-18 PROCEDURE — 80048 BASIC METABOLIC PNL TOTAL CA: CPT

## 2021-11-18 PROCEDURE — 87556 M.TUBERCULO DNA AMP PROBE: CPT

## 2021-11-18 PROCEDURE — 87449 NOS EACH ORGANISM AG IA: CPT

## 2021-11-18 PROCEDURE — 85025 COMPLETE CBC W/AUTO DIFF WBC: CPT

## 2021-11-18 RX ORDER — PYRIDOXINE HCL (VITAMIN B6) 100 MG
1 TABLET ORAL
Qty: 30 | Refills: 0
Start: 2021-11-18 | End: 2021-12-17

## 2021-11-18 RX ORDER — PYRAZINAMIDE 500 MG/1
2 TABLET ORAL
Qty: 60 | Refills: 0
Start: 2021-11-18 | End: 2021-12-17

## 2021-11-18 RX ORDER — HEXAVITAMINS
1 TABLET ORAL
Qty: 30 | Refills: 0
Start: 2021-11-18 | End: 2021-12-17

## 2021-11-18 RX ORDER — ETHAMBUTOL HYDROCHLORIDE 400 MG/1
2 TABLET, FILM COATED ORAL
Qty: 60 | Refills: 0
Start: 2021-11-18 | End: 2021-12-17

## 2021-11-18 RX ADMIN — PYRAZINAMIDE 1000 MILLIGRAM(S): 500 TABLET ORAL at 13:52

## 2021-11-18 RX ADMIN — Medication 325 MILLIGRAM(S): at 13:52

## 2021-11-18 RX ADMIN — Medication 300 MILLIGRAM(S): at 13:59

## 2021-11-18 RX ADMIN — ETHAMBUTOL HYDROCHLORIDE 800 MILLIGRAM(S): 400 TABLET, FILM COATED ORAL at 13:52

## 2021-11-18 RX ADMIN — Medication 50 MILLIGRAM(S): at 13:53

## 2021-11-18 NOTE — PROGRESS NOTE ADULT - ATTENDING COMMENTS
Agree with above,  Active pleural TB.  On RIPE.  Appears to be tolerating.  Ok to discharge from ID standpoint.  He should isolate until on RIPE x 2 weeks (around 11/30). I gave him follow up with me for 12/1 to check LFTs.  Needs opthalmology outpatient eval for visual acuity testing.  Can follow up cultures and susceptibilities as an outpatient

## 2021-11-18 NOTE — PROGRESS NOTE ADULT - SUBJECTIVE AND OBJECTIVE BOX
INFECTIOUS DISEASE PROGRESS NOTE:     INTERVAL HPI/OVERNIGHT EVENTS:    OVERNIGHT: No overnight events.  SUBJECTIVE: Patient seen and examined at bedside.     REVIEW OF SYSTEMS:      All other review of systems is negative unless indicated above.      Allergies    No Known Allergies    Intolerances        ANTIBIOTICS/RELEVANT:  antimicrobials  ethambutol 800 milliGRAM(s) Oral every 24 hours  isoniazid 300 milliGRAM(s) Oral every 24 hours  pyrazinamide 1000 milliGRAM(s) Oral every 24 hours  rifAMPin 600 milliGRAM(s) Oral every 24 hours    immunologic:  influenza   Vaccine 0.5 milliLiter(s) IntraMuscular once    OTHER:  acetaminophen     Tablet .. 650 milliGRAM(s) Oral every 6 hours PRN  ferrous    sulfate 325 milliGRAM(s) Oral daily  ondansetron Injectable 4 milliGRAM(s) IV Push every 6 hours PRN  pyridoxine 50 milliGRAM(s) Oral every 24 hours      Objective:  Vital Signs Last 24 Hrs  T(C): 37.1 (18 Nov 2021 06:16), Max: 37.1 (17 Nov 2021 11:40)  T(F): 98.7 (18 Nov 2021 06:16), Max: 98.7 (17 Nov 2021 11:40)  HR: 78 (18 Nov 2021 06:16) (78 - 101)  BP: 99/64 (18 Nov 2021 06:16) (93/54 - 103/63)  BP(mean): 77 (17 Nov 2021 20:47) (77 - 77)  RR: 20 (18 Nov 2021 06:16) (18 - 20)  SpO2: 98% (18 Nov 2021 06:16) (98% - 98%)      PHYSICAL EXAM:    Constitutional:  NAD  Head: NC/AT  Eyes: PERRLA, EOMI, clear conjunctiva  ENT: no nasal discharge; no oropharyngeal erythema or exudates; dry oral mucosa  Neck: supple; no JVD   Respiratory: CTA B/L; no W/R/R, no retractions  Cardiac: +S1/S2; RRR; no M/R/G; PMI non-displaced  Gastrointestinal: soft, NT/ND; no rebound or guarding; +BS, no hepatosplenomegaly  Extremities: WWP, no clubbing or cyanosis; no peripheral edema  Vascular: 2+ radial, DP/PT pulses B/L  Lymphatic: no submandibular or cervical LAD  Skin: no rash, no ulcers  Neurologic: AAOx3;         LABS:                        11.4   4.54  )-----------( 368      ( 17 Nov 2021 08:22 )             37.0     11-17    136  |  100  |  17  ----------------------------<  91  4.4   |  26  |  0.86    Ca    9.1      17 Nov 2021 08:22  Phos  5.0     11-17  Mg     2.2     11-17    TPro  7.3  /  Alb  3.2<L>  /  TBili  0.5  /  DBili  x   /  AST  14  /  ALT  8<L>  /  AlkPhos  83  11-17          MICROBIOLOGY:            RECENT CULTURES:  11-16 @ 01:11  .Sputum Sputum  --  --  --    Culture is being performed.  --  11-15 @ 11:28  .Sputum Sputum  --  --  --    Sputum specimen rejected.  Microscopic examination indicates  oropharyngeal contamination.  Please repeat.  --  11-15 @ 04:05  .Sputum Sputum  --  --  --    Culture is being performed.  --  11-14 @ 21:01  .Sputum Sputum  --  --  --    Sputum specimen rejected.  Microscopic examination indicates  oropharyngeal contamination.  Please repeat.  --  11-14 @ 20:55  .Sputum Sputum  --  --  --    Culture is being performed.  --  11-14 @ 06:33  .Sputum Sputum  --  --  --    Culture is being performed.  --  11-13 @ 21:56  Pleural Fl right pleural effusion  --  --  --    No growth to date.  --  11-13 @ 15:42  .Sputum Sputum  --  --  --    Sputum specimen rejected.  Microscopic examination indicates  oropharyngeal contamination.  Please repeat.  --  11-12 @ 21:12  .Blood Blood  --  --  --    No growth at 5 days.  --      RADIOLOGY & ADDITIONAL STUDIES: INFECTIOUS DISEASE PROGRESS NOTE:     INTERVAL HPI/OVERNIGHT EVENTS:    OVERNIGHT: No overnight events.  SUBJECTIVE: Patient seen and examined at bedside. He reports feeling well, cough has resolved. Afebrile.     REVIEW OF SYSTEMS:      All other review of systems is negative unless indicated above.      Allergies    No Known Allergies    Intolerances        ANTIBIOTICS/RELEVANT:  antimicrobials  ethambutol 800 milliGRAM(s) Oral every 24 hours  isoniazid 300 milliGRAM(s) Oral every 24 hours  pyrazinamide 1000 milliGRAM(s) Oral every 24 hours  rifAMPin 600 milliGRAM(s) Oral every 24 hours    immunologic:  influenza   Vaccine 0.5 milliLiter(s) IntraMuscular once    OTHER:  acetaminophen     Tablet .. 650 milliGRAM(s) Oral every 6 hours PRN  ferrous    sulfate 325 milliGRAM(s) Oral daily  ondansetron Injectable 4 milliGRAM(s) IV Push every 6 hours PRN  pyridoxine 50 milliGRAM(s) Oral every 24 hours      Objective:  Vital Signs Last 24 Hrs  T(C): 37.1 (18 Nov 2021 06:16), Max: 37.1 (17 Nov 2021 11:40)  T(F): 98.7 (18 Nov 2021 06:16), Max: 98.7 (17 Nov 2021 11:40)  HR: 78 (18 Nov 2021 06:16) (78 - 101)  BP: 99/64 (18 Nov 2021 06:16) (93/54 - 103/63)  BP(mean): 77 (17 Nov 2021 20:47) (77 - 77)  RR: 20 (18 Nov 2021 06:16) (18 - 20)  SpO2: 98% (18 Nov 2021 06:16) (98% - 98%)      PHYSICAL EXAM:    Constitutional:  NAD  Head: NC/AT  Eyes: PERRLA, EOMI, clear conjunctiva  ENT: no nasal discharge; no oropharyngeal erythema or exudates; dry oral mucosa  Neck: supple; no JVD   Respiratory: CTA B/L; no W/R/R, no retractions  Cardiac: +S1/S2; RRR; no M/R/G; PMI non-displaced  Gastrointestinal: soft, NT/ND; no rebound or guarding; +BS, no hepatosplenomegaly  Extremities: WWP, no clubbing or cyanosis; no peripheral edema  Vascular: 2+ radial, DP/PT pulses B/L  Lymphatic: no submandibular or cervical LAD  Skin: no rash, no ulcers  Neurologic: AAOx3;         LABS:                        11.4   4.54  )-----------( 368      ( 17 Nov 2021 08:22 )             37.0     11-17    136  |  100  |  17  ----------------------------<  91  4.4   |  26  |  0.86    Ca    9.1      17 Nov 2021 08:22  Phos  5.0     11-17  Mg     2.2     11-17    TPro  7.3  /  Alb  3.2<L>  /  TBili  0.5  /  DBili  x   /  AST  14  /  ALT  8<L>  /  AlkPhos  83  11-17          MICROBIOLOGY:            RECENT CULTURES:  11-16 @ 01:11  .Sputum Sputum  --  --  --    Culture is being performed.  --  11-15 @ 11:28  .Sputum Sputum  --  --  --    Sputum specimen rejected.  Microscopic examination indicates  oropharyngeal contamination.  Please repeat.  --  11-15 @ 04:05  .Sputum Sputum  --  --  --    Culture is being performed.  --  11-14 @ 21:01  .Sputum Sputum  --  --  --    Sputum specimen rejected.  Microscopic examination indicates  oropharyngeal contamination.  Please repeat.  --  11-14 @ 20:55  .Sputum Sputum  --  --  --    Culture is being performed.  --  11-14 @ 06:33  .Sputum Sputum  --  --  --    Culture is being performed.  --  11-13 @ 21:56  Pleural Fl right pleural effusion  --  --  --    No growth to date.  --  11-13 @ 15:42  .Sputum Sputum  --  --  --    Sputum specimen rejected.  Microscopic examination indicates  oropharyngeal contamination.  Please repeat.  --  11-12 @ 21:12  .Blood Blood  --  --  --    No growth at 5 days.  --      RADIOLOGY & ADDITIONAL STUDIES:

## 2021-11-18 NOTE — PROGRESS NOTE ADULT - ASSESSMENT
21M from [place] with frequent travel, no PMHx, presents with fever, night sweats, and productive cough, found to have right sided loculated pleural effusion, tree-in-bud nodules, and bronchiectasis on CT scan, as well as positive QuantiFeron gold test, being managed for Active pulmonary tuberculosis. Now s/p chest tube drainage of 1.3L serous cloudy fluid. Sputum AFB smears have been negative x3.  TB positive in sputum by PCR on 11/14. Pleural fluid sample, TB PCR not detected. AFB cultures are pending, likely will take 4-6 weeks to result. S/p Initiation of RIPE treatment for active TB with normal LFTs.       #Active Pulmonary Tuberculosis     Recommend:  1. Continue with treatment for active TB with RIPE: (weight based)          -Rifampin  600mg qd          -Isoniazid 300mg qd          -pyrazinamide 1000mg qd          -ethambutol 800mg qd         And continue Pyridoxine (Vitamin B6) 50 mg PO daily  2. Send HIV Ag/Ab test  3. Requires eye exam while on treatment with ethambutol (per patient this was done recently)   4. Monitor LFTs while on treatment   5. F/u AFB culture  6. Monitor inpatient while on initiation of treatment   7. Will need to follow up with Dr. Carrasco in 2 weeks once discharge, [phone]     Will need to contact department of health regarding need for isolation duration in setting of negative AFB sputum/ initiation of treatment.   From ID standpoint can discharge tomorrow if patient tolerates medication.     ID Team 1 following    Plan discussed with ID attending.  21M from [place] with frequent travel, no PMHx, presents with fever, night sweats, and productive cough, found to have right sided loculated pleural effusion, tree-in-bud nodules, and bronchiectasis on CT scan, as well as positive QuantiFeron gold test, being managed for Active pulmonary tuberculosis. Now s/p chest tube drainage of 1.3L serous cloudy fluid. Sputum AFB smears have been negative x3.  TB positive in sputum by PCR on 11/14. Pleural fluid sample, TB PCR not detected. AFB cultures are pending, likely will take 4-6 weeks to result. S/p Initiation of RIPE treatment for active TB with normal LFTs.       #Active Pulmonary Tuberculosis     Recommend:  1. Continue with treatment for active TB with RIPE: (weight based)          -Rifampin  600mg qd          -Isoniazid 300mg qd          -pyrazinamide 1000mg qd          -ethambutol 800mg qd         And continue Pyridoxine (Vitamin B6) 50 mg PO daily  2. Send HIV Ag/Ab test  3. Requires eye exam while on treatment with ethambutol (per patient this was done recently)   4. Monitor LFTs while on treatment   5. F/u AFB culture  6. Okay for discharge from ID standpoint, should be given 1 month of medications and will refill at follow up   7. Follow up with Dr. Carrasco in 2 weeks, Phone: [phone], has appointment on 12/1/21 at 10:30 am at [address] [place] [address], 4th floor, [place], [phone], option 2 .       ID Team 1 will sign off at this time, reconsult as needed.   Plan discussed with ID attending.  21M from [place] with frequent travel, no PMHx, presents with fever, night sweats, and productive cough, found to have right sided loculated pleural effusion, tree-in-bud nodules, and bronchiectasis on CT scan, as well as positive QuantiFeron gold test, being managed for Active pulmonary tuberculosis. Now s/p chest tube drainage of 1.3L serous cloudy fluid. Sputum AFB smears have been negative x3.  TB positive in sputum by PCR on 11/14. Pleural fluid sample, TB PCR not detected. AFB cultures are pending, likely will take 4-6 weeks to result. S/p Initiation of RIPE treatment for active TB with normal LFTs.       #Active Pulmonary Tuberculosis     Recommend:  1. Continue with treatment for active TB with RIPE: (weight based)          -Rifampin  600mg qd          -Isoniazid 300mg qd          -pyrazinamide 1000mg qd          -ethambutol 800mg qd         And continue Pyridoxine (Vitamin B6) 50 mg PO daily    2. Requires eye exam while on treatment with ethambutol (per patient this was done recently)   3. Monitor LFTs while on treatment   4. F/u AFB culture  5. Okay for discharge from ID standpoint, should be given 1 month of medications and will refill at follow up   6. Follow up with Dr. Carrasco in 2 weeks, Phone: [phone], has appointment on 12/1/21 at 10:30 am at [address] [place] [address], 4th [facility], [place], [phone], option 2 .       ID Team 1 will sign off at this time, reconsult as needed.   Plan discussed with ID attending.

## 2021-11-18 NOTE — DISCHARGE NOTE NURSING/CASE MANAGEMENT/SOCIAL WORK - PATIENT PORTAL LINK FT
You can access the FollowMyHealth Patient Portal offered by [facility] by registering at the following website: [email or web address]. By joining [facility]’s FollowMyHealth portal, you will also be able to view your health information using other applications (apps) compatible with our system.

## 2021-11-18 NOTE — PROGRESS NOTE ADULT - PROVIDER SPECIALTY LIST ADULT
Pulmonology
Infectious Disease
Pulmonology
Pulmonology
Infectious Disease
Infectious Disease
Internal Medicine
Hospitalist

## 2021-11-18 NOTE — PROGRESS NOTE ADULT - NUTRITIONAL ASSESSMENT
This patient has been assessed with a concern for Malnutrition and has been determined to have a diagnosis/diagnoses of Moderate protein-calorie malnutrition and Underweight (BMI < 19).    This patient is being managed with:   Diet Regular-  Entered: Nov 12 2021  8:13PM    

## 2021-11-23 DIAGNOSIS — R05.9 COUGH, UNSPECIFIED: ICD-10-CM

## 2021-11-23 DIAGNOSIS — A15.6 TUBERCULOUS PLEURISY: ICD-10-CM

## 2021-11-23 DIAGNOSIS — A15.0 TUBERCULOSIS OF LUNG: ICD-10-CM

## 2021-11-23 DIAGNOSIS — R91.8 OTHER NONSPECIFIC ABNORMAL FINDING OF LUNG FIELD: ICD-10-CM

## 2021-11-23 DIAGNOSIS — D50.9 IRON DEFICIENCY ANEMIA, UNSPECIFIED: ICD-10-CM

## 2021-11-23 DIAGNOSIS — E44.0 MODERATE PROTEIN-CALORIE MALNUTRITION: ICD-10-CM

## 2021-11-30 PROBLEM — Z00.00 ENCOUNTER FOR PREVENTIVE HEALTH EXAMINATION: Status: ACTIVE | Noted: 2021-11-30

## 2021-12-01 ENCOUNTER — APPOINTMENT (OUTPATIENT)
Dept: INFECTIOUS DISEASE | Facility: CLINIC | Age: 21
End: 2021-12-01
Payer: SELF-PAY

## 2021-12-01 VITALS
SYSTOLIC BLOOD PRESSURE: 114 MMHG | BODY MASS INDEX: 15.77 KG/M2 | OXYGEN SATURATION: 99 % | HEIGHT: 69 IN | TEMPERATURE: 97.9 F | WEIGHT: 106.5 LBS | DIASTOLIC BLOOD PRESSURE: 75 MMHG | HEART RATE: 117 BPM

## 2021-12-01 PROCEDURE — 99213 OFFICE O/P EST LOW 20 MIN: CPT

## 2021-12-01 NOTE — HISTORY OF PRESENT ILLNESS
[FreeTextEntry1] : 21 year old male with no PMH, recently immigrated to [place] from [place] 3 years ago was admitted to [facility] 11/12 with R sided pleural effusion, was found to have Active TB. He was started on RIPE + pyridoxine.  He is tolerating medications.  He has been isolating the past 2 weeks, is being follow by [facility]. He is asymptomatic.

## 2021-12-02 ENCOUNTER — NON-APPOINTMENT (OUTPATIENT)
Age: 21
End: 2021-12-02

## 2021-12-02 LAB
ALBUMIN SERPL ELPH-MCNC: 4.1 G/DL
ALP BLD-CCNC: 119 U/L
ALT SERPL-CCNC: 15 U/L
ANION GAP SERPL CALC-SCNC: 11 MMOL/L
AST SERPL-CCNC: 24 U/L
BASOPHILS # BLD AUTO: 0.03 K/UL
BASOPHILS NFR BLD AUTO: 0.5 %
BILIRUB SERPL-MCNC: 0.2 MG/DL
BUN SERPL-MCNC: 11 MG/DL
CALCIUM SERPL-MCNC: 9.7 MG/DL
CHLORIDE SERPL-SCNC: 103 MMOL/L
CO2 SERPL-SCNC: 27 MMOL/L
CREAT SERPL-MCNC: 0.75 MG/DL
EOSINOPHIL # BLD AUTO: 0.27 K/UL
EOSINOPHIL NFR BLD AUTO: 4.1 %
GLUCOSE SERPL-MCNC: 88 MG/DL
HCT VFR BLD CALC: 42.3 %
HGB BLD-MCNC: 12.4 G/DL
IMM GRANULOCYTES NFR BLD AUTO: 0.2 %
LYMPHOCYTES # BLD AUTO: 1.44 K/UL
LYMPHOCYTES NFR BLD AUTO: 21.7 %
MAN DIFF?: NORMAL
MCHC RBC-ENTMCNC: 22.6 PG
MCHC RBC-ENTMCNC: 29.3 GM/DL
MCV RBC AUTO: 77.2 FL
MONOCYTES # BLD AUTO: 0.59 K/UL
MONOCYTES NFR BLD AUTO: 8.9 %
NEUTROPHILS # BLD AUTO: 4.3 K/UL
NEUTROPHILS NFR BLD AUTO: 64.6 %
PLATELET # BLD AUTO: 432 K/UL
POTASSIUM SERPL-SCNC: 4.2 MMOL/L
PROT SERPL-MCNC: 8 G/DL
RBC # BLD: 5.48 M/UL
RBC # FLD: 15.9 %
SODIUM SERPL-SCNC: 141 MMOL/L
WBC # FLD AUTO: 6.64 K/UL

## 2021-12-07 PROBLEM — Z00.00 ENCOUNTER FOR PREVENTIVE HEALTH EXAMINATION: Noted: 2021-12-07

## 2021-12-09 ENCOUNTER — APPOINTMENT (OUTPATIENT)
Dept: PULMONOLOGY | Facility: CLINIC | Age: 21
End: 2021-12-09
Payer: SELF-PAY

## 2021-12-09 VITALS
WEIGHT: 107 LBS | HEIGHT: 69 IN | DIASTOLIC BLOOD PRESSURE: 69 MMHG | SYSTOLIC BLOOD PRESSURE: 103 MMHG | TEMPERATURE: 98.2 F | BODY MASS INDEX: 15.85 KG/M2 | RESPIRATION RATE: 12 BRPM | HEART RATE: 119 BPM | OXYGEN SATURATION: 99 %

## 2021-12-09 PROCEDURE — 99203 OFFICE O/P NEW LOW 30 MIN: CPT

## 2021-12-13 NOTE — HISTORY OF PRESENT ILLNESS
[TextBox_4] : 21 yom with no significant past medical history recently diagnosed with pulmonary tuberculosis.\par \par Patient was admitted to [facility] and was hospitalized between 11/12-11/18 2021 for right pleural effusion which was ultimately shown to have AFB organism, sputum grew MTB.\par Thoracentesis: exudative lymphocytic fluid.Fluid showed presence/growth of AFB positive organism, dna probe was positive for RALPH. \par Sputum showed AFB positive organism and xpert was positive for MTB/M. Bovis\par Patient was started on RIPE. \par Since discharge he has been doing well, tolerating medication well.\par He denies cough, SOB, fevers or chills. he still feels more fatigued now that he has returned to school. He is taking his medications every day in the morning. He has good appetite and thinks he gained 1-2 lbs. Urine is orange, no other significant changes. Has not yet seen eye doctor. Followed up with ID on December 1st.\par Had COVID-19 vaccine x2, no booster. Has not had flu shot.\par

## 2021-12-13 NOTE — PHYSICAL EXAM
[No Acute Distress] : no acute distress [Well Groomed] : well groomed [Normal Oropharynx] : normal oropharynx [Normal Appearance] : normal appearance [No Neck Mass] : no neck mass [Normal Rate/Rhythm] : normal rate/rhythm [Normal S1, S2] : normal s1, s2 [No Murmurs] : no murmurs [No Resp Distress] : no resp distress [Clear to Auscultation Bilaterally] : clear to auscultation bilaterally [No Abnormalities] : no abnormalities [Normal Gait] : normal gait [No Clubbing] : no clubbing [No Edema] : no edema [Normal Color/ Pigmentation] : normal color/ pigmentation [Normal Affect] : normal affect [Benign] : benign [Soft] : soft [No Cyanosis] : no cyanosis [No Focal Deficits] : no focal deficits [Oriented x3] : oriented x3 [TextBox_44] : No cervical or supraclavicular lymphadenopathy. [TextBox_80] : No axiliary lymphadenopathy

## 2021-12-13 NOTE — PROCEDURE
[FreeTextEntry1] : bedside US exam:12/9/21: \par Right: Trace right pleural effusion, no consolidation, a line predominant\par Left: No effusion, no consolidation, a line predominant

## 2021-12-13 NOTE — REVIEW OF SYSTEMS
[Recent Wt Gain (___ Lbs)] : ~T recent [unfilled] lb weight gain [Negative] : Psychiatric [Fever] : no fever [Chills] : no chills [Cough] : no cough [Hemoptysis] : no hemoptysis [Chest Tightness] : no chest tightness [Sputum] : no sputum [Dyspnea] : no dyspnea [Pleuritic Pain] : no pleuritic pain [Wheezing] : no wheezing [SOB on Exertion] : no sob on exertion [Chest Discomfort] : no chest discomfort [Edema] : no edema [TextBox_83] : orange urine [TextBox_101] : no itching

## 2021-12-13 NOTE — ASSESSMENT
[FreeTextEntry1] : 21 yom with recent diagnosis of pulmonary tuberculosis started on RIPE.\par \par Patient started on RIPE with B6 on 11/16/2021.\par He continues to follow with ID regarding treatment plan.\par RIF 600mg daily\par INH 300mg daily\par PZA 1000mg daily, \par EMB 800mg daily\par B6 50mg daily \par \par [employer] AE discussed with patient and his father at length.\par \par Xpert was negative for rif resistance, he will be able to go down to 2 drugs by January 16th and complete rest of the treatment ( 4 months) on rifampin and INH.\par Right pleural effusion almost resolved, only trace fluid noted. Will check US exam again in 3 month, expecting to see no signs of effusion at that point.Patient will need repeat imaging CXR or CT at 2 month on treatment.\par \par \par

## 2021-12-15 LAB
CULTURE RESULTS: SIGNIFICANT CHANGE UP
SPECIMEN SOURCE: SIGNIFICANT CHANGE UP

## 2022-01-05 ENCOUNTER — APPOINTMENT (OUTPATIENT)
Dept: INFECTIOUS DISEASE | Facility: CLINIC | Age: 22
End: 2022-01-05
Payer: SELF-PAY

## 2022-01-05 VITALS
HEIGHT: 69 IN | WEIGHT: 108.13 LBS | BODY MASS INDEX: 16.02 KG/M2 | TEMPERATURE: 97.7 F | OXYGEN SATURATION: 99 % | HEART RATE: 102 BPM | DIASTOLIC BLOOD PRESSURE: 68 MMHG | SYSTOLIC BLOOD PRESSURE: 96 MMHG

## 2022-01-05 LAB
CULTURE RESULTS: SIGNIFICANT CHANGE UP
SPECIMEN SOURCE: SIGNIFICANT CHANGE UP

## 2022-01-05 PROCEDURE — 99213 OFFICE O/P EST LOW 20 MIN: CPT

## 2022-01-05 NOTE — HISTORY OF PRESENT ILLNESS
[FreeTextEntry1] : 21 year old male with no PMH, recently [place] from [place] years ago was admitted to [facility] 11/12 with R sided pleural effusion, was found to have Active TB. He was started on RIPE + pyridoxine.  He is tolerating medications.  He is being follow by [facility]. He is no longer on isolation and is asymptomatic.

## 2022-01-05 NOTE — PHYSICAL EXAM
[General Appearance - Alert] : alert [Sclera] : the sclera and conjunctiva were normal [Outer Ear] : the ears and nose were normal in appearance [Neck Appearance] : the appearance of the neck was normal [Heart Rate And Rhythm] : heart rate was normal and rhythm regular [Heart Sounds] : normal S1 and S2 [Edema] : there was no peripheral edema [Bowel Sounds] : normal bowel sounds [No Palpable Adenopathy] : no palpable adenopathy [Musculoskeletal - Swelling] : no joint swelling [] : no rash [Motor Exam] : the motor exam was normal [Oriented To Time, Place, And Person] : oriented to person, place, and time

## 2022-01-06 ENCOUNTER — NON-APPOINTMENT (OUTPATIENT)
Age: 22
End: 2022-01-06

## 2022-01-06 LAB
ALBUMIN SERPL ELPH-MCNC: 4.3 G/DL
ALP BLD-CCNC: 127 U/L
ALT SERPL-CCNC: 24 U/L
ANION GAP SERPL CALC-SCNC: 12 MMOL/L
AST SERPL-CCNC: 32 U/L
BASOPHILS # BLD AUTO: 0.05 K/UL
BASOPHILS NFR BLD AUTO: 0.8 %
BILIRUB SERPL-MCNC: 0.2 MG/DL
BUN SERPL-MCNC: 10 MG/DL
CALCIUM SERPL-MCNC: 9.5 MG/DL
CHLORIDE SERPL-SCNC: 104 MMOL/L
CO2 SERPL-SCNC: 25 MMOL/L
CREAT SERPL-MCNC: 0.9 MG/DL
EOSINOPHIL # BLD AUTO: 0.36 K/UL
EOSINOPHIL NFR BLD AUTO: 6 %
GLUCOSE SERPL-MCNC: 97 MG/DL
HCT VFR BLD CALC: 42 %
HGB BLD-MCNC: 12.2 G/DL
IMM GRANULOCYTES NFR BLD AUTO: 0.2 %
LYMPHOCYTES # BLD AUTO: 1.18 K/UL
LYMPHOCYTES NFR BLD AUTO: 19.5 %
MAN DIFF?: NORMAL
MCHC RBC-ENTMCNC: 22 PG
MCHC RBC-ENTMCNC: 29 GM/DL
MCV RBC AUTO: 75.8 FL
MONOCYTES # BLD AUTO: 0.53 K/UL
MONOCYTES NFR BLD AUTO: 8.8 %
NEUTROPHILS # BLD AUTO: 3.92 K/UL
NEUTROPHILS NFR BLD AUTO: 64.7 %
PLATELET # BLD AUTO: 414 K/UL
POTASSIUM SERPL-SCNC: 4.1 MMOL/L
PROT SERPL-MCNC: 7.6 G/DL
RBC # BLD: 5.54 M/UL
RBC # FLD: 15.2 %
SODIUM SERPL-SCNC: 141 MMOL/L
WBC # FLD AUTO: 6.05 K/UL

## 2022-01-11 LAB
-  ETHAMBUTOL (5.0 MCG/ML): SIGNIFICANT CHANGE UP
-  ISONIAZID (0.1 UG/ML): SIGNIFICANT CHANGE UP
-  PYRAZINAMIDE (100.0 UG/ML): SIGNIFICANT CHANGE UP
-  RIFAMPIN (1.0 UG/ML): SIGNIFICANT CHANGE UP
METHOD TYPE: SIGNIFICANT CHANGE UP

## 2022-01-13 ENCOUNTER — NON-APPOINTMENT (OUTPATIENT)
Age: 22
End: 2022-01-13

## 2022-01-26 ENCOUNTER — NON-APPOINTMENT (OUTPATIENT)
Age: 22
End: 2022-01-26

## 2022-02-01 LAB
-  ETHAMBUTOL: SIGNIFICANT CHANGE UP
-  ETHIONAMIDE: SIGNIFICANT CHANGE UP
-  FLUOROQUINOLONES: SIGNIFICANT CHANGE UP
-  ISONIAZID: SIGNIFICANT CHANGE UP
-  KANAMYCIN/AMIKACIN: SIGNIFICANT CHANGE UP
-  KANAMYCIN: SIGNIFICANT CHANGE UP
-  PYRAZINAMIDE: SIGNIFICANT CHANGE UP
-  RIFAMPIN: SIGNIFICANT CHANGE UP
-  STREPTOMYCIN: SIGNIFICANT CHANGE UP
-  WHOLE GENOME SEQUENCING: SIGNIFICANT CHANGE UP
CULTURE RESULTS: SIGNIFICANT CHANGE UP
METHOD TYPE: SIGNIFICANT CHANGE UP
ORGANISM # SPEC MICROSCOPIC CNT: SIGNIFICANT CHANGE UP
SPECIMEN SOURCE: SIGNIFICANT CHANGE UP

## 2022-02-02 ENCOUNTER — OUTPATIENT (OUTPATIENT)
Dept: OUTPATIENT SERVICES | Facility: HOSPITAL | Age: 22
LOS: 1 days | End: 2022-02-02
Payer: SELF-PAY

## 2022-02-02 ENCOUNTER — APPOINTMENT (OUTPATIENT)
Dept: INFECTIOUS DISEASE | Facility: CLINIC | Age: 22
End: 2022-02-02
Payer: SELF-PAY

## 2022-02-02 VITALS
SYSTOLIC BLOOD PRESSURE: 110 MMHG | HEART RATE: 93 BPM | BODY MASS INDEX: 16.22 KG/M2 | OXYGEN SATURATION: 100 % | WEIGHT: 109.5 LBS | DIASTOLIC BLOOD PRESSURE: 73 MMHG | TEMPERATURE: 97 F | HEIGHT: 69 IN

## 2022-02-02 PROCEDURE — 99213 OFFICE O/P EST LOW 20 MIN: CPT

## 2022-02-02 PROCEDURE — 71046 X-RAY EXAM CHEST 2 VIEWS: CPT | Mod: 26

## 2022-02-02 PROCEDURE — 71046 X-RAY EXAM CHEST 2 VIEWS: CPT

## 2022-02-02 RX ORDER — PYRIDOXINE HCL (VITAMIN B6) 50 MG
50 TABLET ORAL DAILY
Qty: 90 | Refills: 0 | Status: ACTIVE | COMMUNITY
Start: 2021-12-01 | End: 1900-01-01

## 2022-02-02 RX ORDER — PYRAZINAMIDE 500 MG/1
500 TABLET ORAL DAILY
Qty: 60 | Refills: 1 | Status: COMPLETED | COMMUNITY
Start: 2021-12-01 | End: 2022-02-02

## 2022-02-02 RX ORDER — ETHAMBUTOL HYDROCHLORIDE 400 MG/1
400 TABLET ORAL DAILY
Qty: 60 | Refills: 1 | Status: COMPLETED | COMMUNITY
Start: 2021-12-01 | End: 2022-02-02

## 2022-02-02 RX ORDER — ISONIAZID 300 MG/1
300 TABLET ORAL
Qty: 90 | Refills: 0 | Status: ACTIVE | COMMUNITY
Start: 2021-12-01 | End: 1900-01-01

## 2022-02-02 RX ORDER — RIFAMPIN 300 MG/1
300 CAPSULE ORAL DAILY
Qty: 180 | Refills: 0 | Status: ACTIVE | COMMUNITY
Start: 2021-12-01 | End: 1900-01-01

## 2022-02-02 NOTE — PHYSICAL EXAM
[General Appearance - Alert] : alert [Sclera] : the sclera and conjunctiva were normal [Outer Ear] : the ears and nose were normal in appearance [Neck Appearance] : the appearance of the neck was normal [Auscultation Breath Sounds / Voice Sounds] : lungs were clear to auscultation bilaterally [Heart Rate And Rhythm] : heart rate was normal and rhythm regular [Edema] : there was no peripheral edema [Bowel Sounds] : normal bowel sounds [No Palpable Adenopathy] : no palpable adenopathy [] : no rash [Motor Exam] : the motor exam was normal [No Focal Deficits] : no focal deficits [Oriented To Time, Place, And Person] : oriented to person, place, and time

## 2022-02-03 ENCOUNTER — APPOINTMENT (OUTPATIENT)
Dept: PULMONOLOGY | Facility: CLINIC | Age: 22
End: 2022-02-03
Payer: SELF-PAY

## 2022-02-03 VITALS
HEART RATE: 104 BPM | OXYGEN SATURATION: 98 % | TEMPERATURE: 98.7 F | WEIGHT: 107 LBS | SYSTOLIC BLOOD PRESSURE: 99 MMHG | RESPIRATION RATE: 12 BRPM | BODY MASS INDEX: 15.85 KG/M2 | HEIGHT: 69 IN | DIASTOLIC BLOOD PRESSURE: 66 MMHG

## 2022-02-03 DIAGNOSIS — A15.9 RESPIRATORY TUBERCULOSIS UNSPECIFIED: ICD-10-CM

## 2022-02-03 LAB
ALBUMIN SERPL ELPH-MCNC: 4.5 G/DL
ALP BLD-CCNC: 122 U/L
ALT SERPL-CCNC: 32 U/L
ANION GAP SERPL CALC-SCNC: 10 MMOL/L
AST SERPL-CCNC: 32 U/L
BASOPHILS # BLD AUTO: 0.04 K/UL
BASOPHILS NFR BLD AUTO: 0.8 %
BILIRUB SERPL-MCNC: 0.4 MG/DL
BUN SERPL-MCNC: 13 MG/DL
CALCIUM SERPL-MCNC: 10 MG/DL
CHLORIDE SERPL-SCNC: 103 MMOL/L
CO2 SERPL-SCNC: 26 MMOL/L
CREAT SERPL-MCNC: 0.79 MG/DL
EOSINOPHIL # BLD AUTO: 0.28 K/UL
EOSINOPHIL NFR BLD AUTO: 5.3 %
GLUCOSE SERPL-MCNC: 81 MG/DL
HCT VFR BLD CALC: 40.6 %
HGB BLD-MCNC: 11.7 G/DL
IMM GRANULOCYTES NFR BLD AUTO: 0.2 %
LYMPHOCYTES # BLD AUTO: 1.37 K/UL
LYMPHOCYTES NFR BLD AUTO: 25.8 %
MAN DIFF?: NORMAL
MCHC RBC-ENTMCNC: 21 PG
MCHC RBC-ENTMCNC: 28.8 GM/DL
MCV RBC AUTO: 72.8 FL
MONOCYTES # BLD AUTO: 0.52 K/UL
MONOCYTES NFR BLD AUTO: 9.8 %
NEUTROPHILS # BLD AUTO: 3.1 K/UL
NEUTROPHILS NFR BLD AUTO: 58.1 %
PLATELET # BLD AUTO: 384 K/UL
POTASSIUM SERPL-SCNC: 4.4 MMOL/L
PROT SERPL-MCNC: 7.4 G/DL
RBC # BLD: 5.58 M/UL
RBC # FLD: 16.1 %
SODIUM SERPL-SCNC: 140 MMOL/L
WBC # FLD AUTO: 5.32 K/UL

## 2022-02-03 PROCEDURE — 99213 OFFICE O/P EST LOW 20 MIN: CPT

## 2022-02-04 NOTE — HISTORY OF PRESENT ILLNESS
[TextBox_4] : 21 yom with no significant past medical history, diagnosed with pulmonary tuberculosis at [facility] in Novemebr 2021, subsequently started on RIPE.\par \par 2/3/2022: Presents for follow up visit. xpert without rifampin mutation, and transitioned to two drug regimen on January 16th (Rifampin and INH). Has been taking daily with B6 without any missed doses or adverse effects. Has been gaining weight and feeling well overall. No fevers, chills, night sweats, or hemoptysis. Occasionally has R sided, anterior pleuritic chest pain, however, this does not manifest in overt shortness of breath or any acute exacerbation of symptoms. His father is moving back to [place] this Saturday and the patient will accompany him to visit his family. He plans to come back after and continue his studies, but his father will be moving to [facility] afterwards. \par \par 12/9/2021: Patient was admitted to [facility] and was hospitalized between 11/12-11/18 2021 for right pleural effusion which was ultimately shown to have AFB organism, sputum grew MTB.\par Thoracentesis: exudative lymphocytic fluid.Fluid showed presence/growth of AFB positive organism, dna probe was positive for RALPH. \par Sputum showed AFB positive organism and xpert was positive for MTB/M. Bovis\par Patient was started on RIPE. \par Since discharge he has been doing well, tolerating medication well.\par He denies cough, SOB, fevers or chills. he still feels more fatigued now that he has returned to school. He is taking his medications every day in the morning. He has good appetite and thinks he gained 1-2 lbs. Urine is orange, no other significant changes. Has not yet seen eye doctor. Followed up with ID on December 1st.\par Had COVID-19 vaccine x2, no booster. Has not had flu shot.\par

## 2022-02-04 NOTE — ASSESSMENT
[FreeTextEntry1] : 21 yom with diagnosis of pulmonary tuberculosis started on RIPE. Xpert was negative for rif resistance, and subsequently changed to 2 drug regimen on January 16th and rest of treatment will be completed (4 months) on rifampin and INH.\par \par Patient started on RIPE with B6 on 11/16/2021.\par Changed to Rifampin/INH on January 16th. 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

## 2022-02-04 NOTE — END OF VISIT
[] : Resident [Time Spent: ___ minutes] : I have spent [unfilled] minutes of time on the encounter. [FreeTextEntry3] : Agree with resident's history and exam. Management discussed at length with resident.

## 2022-02-04 NOTE — PHYSICAL EXAM
[No Acute Distress] : no acute distress [Well Groomed] : well groomed [Normal Oropharynx] : normal oropharynx [Normal Appearance] : normal appearance [No Neck Mass] : no neck mass [Normal Rate/Rhythm] : normal rate/rhythm [Normal S1, S2] : normal s1, s2 [No Murmurs] : no murmurs [No Resp Distress] : no resp distress [Clear to Auscultation Bilaterally] : clear to auscultation bilaterally [No Abnormalities] : no abnormalities [Benign] : benign [Soft] : soft [Normal Gait] : normal gait [No Clubbing] : no clubbing [No Cyanosis] : no cyanosis [No Edema] : no edema [Normal Color/ Pigmentation] : normal color/ pigmentation [No Focal Deficits] : no focal deficits [Oriented x3] : oriented x3 [Normal Affect] : normal affect [TextBox_44] : No cervical or supraclavicular lymphadenopathy. [TextBox_80] : No axiliary lymphadenopathy

## 2022-02-04 NOTE — PROCEDURE
[FreeTextEntry1] : bedside US exam:2/3/21: \par Right: small focus of loculated right sided pleural fluid in posterior axilary line noted in 1ICS. B-line arising from loculated area of pleural space, no hepatization of the lung. rest of the right lung showing a line predominant.\par \par CXR:2/2/20\par decreased right pleff, small nodule in RLL, improved from cxr from 11/13/2021.

## 2022-03-02 LAB — ACID FAST STN SPT: ABNORMAL

## 2022-03-08 ENCOUNTER — NON-APPOINTMENT (OUTPATIENT)
Age: 22
End: 2022-03-08

## 2022-03-23 ENCOUNTER — RX RENEWAL (OUTPATIENT)
Age: 22
End: 2022-03-23

## 2022-03-23 LAB — ACID FAST STN SPT: ABNORMAL

## 2022-03-23 RX ORDER — UREA 10 %
50 LOTION (ML) TOPICAL
Qty: 30 | Refills: 1 | Status: ACTIVE | COMMUNITY
Start: 2022-03-23 | End: 1900-01-01

## 2022-05-19 ENCOUNTER — APPOINTMENT (OUTPATIENT)
Dept: PULMONOLOGY | Facility: CLINIC | Age: 22
End: 2022-05-19

## 2022-07-19 ENCOUNTER — TRANSCRIPTION ENCOUNTER (OUTPATIENT)
Age: 22
End: 2022-07-19

## 2022-12-01 NOTE — HISTORY OF PRESENT ILLNESS
How Severe Are They?: moderate Is This A New Presentation, Or A Follow-Up?: Follow Up [place] [FreeTextEntry1] : 21 year old male with no PMH, recently immigrated to [place] from [place] 3 years ago was admitted to 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 He was started on RIPE + pyridoxine. He is tolerating medications. He is being follow by [facility]. He is no longer on isolation and is asymptomatic. \par \par His TB returned as pan-sensitive.  He stopped both Ethambutol and Pyrazinamide on 1/15/22 and continues to take Rifampin, Isoniazid and Vit B6.  He is compliant with treatment and has no side effects.  No fevers, chills, weight loss, night sweats  Additional History: She is here to have these spots removed